# Patient Record
Sex: FEMALE | Race: WHITE | NOT HISPANIC OR LATINO | Employment: OTHER | URBAN - METROPOLITAN AREA
[De-identification: names, ages, dates, MRNs, and addresses within clinical notes are randomized per-mention and may not be internally consistent; named-entity substitution may affect disease eponyms.]

---

## 2018-06-11 ENCOUNTER — APPOINTMENT (OUTPATIENT)
Dept: PREADMISSION TESTING | Facility: HOSPITAL | Age: 38
End: 2018-06-11

## 2018-06-11 VITALS
OXYGEN SATURATION: 100 % | DIASTOLIC BLOOD PRESSURE: 84 MMHG | WEIGHT: 189 LBS | SYSTOLIC BLOOD PRESSURE: 137 MMHG | TEMPERATURE: 98.1 F | HEART RATE: 100 BPM | HEIGHT: 66 IN | RESPIRATION RATE: 16 BRPM | BODY MASS INDEX: 30.37 KG/M2

## 2018-06-11 LAB
ANION GAP SERPL CALCULATED.3IONS-SCNC: 12.8 MMOL/L
BUN BLD-MCNC: 15 MG/DL (ref 6–20)
BUN/CREAT SERPL: 18.8 (ref 7–25)
CALCIUM SPEC-SCNC: 9.2 MG/DL (ref 8.6–10.5)
CHLORIDE SERPL-SCNC: 105 MMOL/L (ref 98–107)
CO2 SERPL-SCNC: 22.2 MMOL/L (ref 22–29)
CREAT BLD-MCNC: 0.8 MG/DL (ref 0.57–1)
DEPRECATED RDW RBC AUTO: 40.5 FL (ref 37–54)
ERYTHROCYTE [DISTWIDTH] IN BLOOD BY AUTOMATED COUNT: 15.2 % (ref 11.7–13)
GFR SERPL CREATININE-BSD FRML MDRD: 80 ML/MIN/1.73
GLUCOSE BLD-MCNC: 99 MG/DL (ref 65–99)
HCG SERPL QL: NEGATIVE
HCT VFR BLD AUTO: 35.2 % (ref 35.6–45.5)
HGB BLD-MCNC: 10.6 G/DL (ref 11.9–15.5)
MCH RBC QN AUTO: 21.9 PG (ref 26.9–32)
MCHC RBC AUTO-ENTMCNC: 30.1 G/DL (ref 32.4–36.3)
MCV RBC AUTO: 72.7 FL (ref 80.5–98.2)
PLATELET # BLD AUTO: 206 10*3/MM3 (ref 140–500)
PMV BLD AUTO: 10.5 FL (ref 6–12)
POTASSIUM BLD-SCNC: 4.1 MMOL/L (ref 3.5–5.2)
RBC # BLD AUTO: 4.84 10*6/MM3 (ref 3.9–5.2)
SODIUM BLD-SCNC: 140 MMOL/L (ref 136–145)
WBC NRBC COR # BLD: 5.62 10*3/MM3 (ref 4.5–10.7)

## 2018-06-11 PROCEDURE — 36415 COLL VENOUS BLD VENIPUNCTURE: CPT

## 2018-06-11 PROCEDURE — 85027 COMPLETE CBC AUTOMATED: CPT | Performed by: OBSTETRICS & GYNECOLOGY

## 2018-06-11 PROCEDURE — 84703 CHORIONIC GONADOTROPIN ASSAY: CPT | Performed by: OBSTETRICS & GYNECOLOGY

## 2018-06-11 PROCEDURE — 80048 BASIC METABOLIC PNL TOTAL CA: CPT | Performed by: OBSTETRICS & GYNECOLOGY

## 2018-06-11 RX ORDER — MECLIZINE HCL 12.5 MG/1
12.5 TABLET ORAL 3 TIMES DAILY PRN
COMMUNITY
End: 2021-04-26 | Stop reason: SDUPTHER

## 2018-06-11 RX ORDER — TOPIRAMATE 50 MG/1
50 TABLET, FILM COATED ORAL EVERY MORNING
COMMUNITY
End: 2021-03-29

## 2018-06-11 RX ORDER — POLYETHYLENE GLYCOL 3350 17 G/17G
17 POWDER, FOR SOLUTION ORAL AS NEEDED
COMMUNITY
End: 2021-03-29

## 2018-06-11 RX ORDER — GUAIFENESIN, PSEUDOEPHEDRINE HYDROCHLORIDE 600; 60 MG/1; MG/1
1 TABLET, EXTENDED RELEASE ORAL EVERY 12 HOURS
COMMUNITY
End: 2021-03-29

## 2018-06-11 RX ORDER — CHOLECALCIFEROL (VITAMIN D3) 125 MCG
5 CAPSULE ORAL NIGHTLY PRN
COMMUNITY

## 2018-06-11 RX ORDER — FLUTICASONE PROPIONATE 50 MCG
1 SPRAY, SUSPENSION (ML) NASAL DAILY
COMMUNITY

## 2018-06-11 RX ORDER — BUPROPION HYDROCHLORIDE 150 MG/1
150 TABLET, EXTENDED RELEASE ORAL 2 TIMES DAILY
COMMUNITY
End: 2021-03-29

## 2018-06-11 NOTE — DISCHARGE INSTRUCTIONS
PLEASE ARRIVE AT 7:30 AM ON 6/13/2018      Take the following medications the morning of surgery with a small sip of water:  TOPAMAX      General Instructions:  • Do not eat solid food after midnight the night before surgery.  • You may drink clear liquids day of surgery but must stop at least one hour before your hospital arrival time. **STOP FLUIDS AT 6:30 AM ON DAY OF SURGERY**  • It is beneficial for you to have a clear drink that contains carbohydrates the day of surgery.  We suggest a 12 to 20 ounce bottle of Gatorade or Powerade for non-diabetic patients or a 12 to 20 ounce bottle of G2 or Powerade Zero for diabetic patients. (Pediatric patients, are not advised to drink a 12 to 20 ounce carbohydrate drink)    Clear liquids are liquids you can see through.  Nothing red in color.     Plain water                               Sports drinks  Sodas                                   Gelatin (Jell-O)  Fruit juices without pulp such as white grape juice and apple juice  Popsicles that contain no fruit or yogurt  Tea or coffee (no cream or milk added)  Gatorade / Powerade  G2 / Powerade Zero    • Infants may have breast milk up to four hours before surgery.  • Infants drinking formula may drink formula up to six hours before surgery.   • Patients who avoid smoking, chewing tobacco and alcohol for 4 weeks prior to surgery have a reduced risk of post-operative complications.  Quit smoking as many days before surgery as you can.  • Do not smoke, use chewing tobacco or drink alcohol the day of surgery.   • If applicable bring your C-PAP/ BI-PAP machine.  • Bring any papers given to you in the doctor’s office.  • Wear clean comfortable clothes and socks.  • Do not wear contact lenses or make-up.  Bring a case for your glasses.   • Bring crutches or walker if applicable.  • Remove all piercings.  Leave jewelry and any other valuables at home.  • Hair extensions with metal clips must be removed prior to surgery.  • The  Pre-Admission Testing nurse will instruct you to bring medications if unable to obtain an accurate list in Pre-Admission Testing.        Preventing a Surgical Site Infection:  • For 2 to 3 days before surgery, avoid shaving with a razor because the razor can irritate skin and make it easier to develop an infection.  • The night prior to surgery sleep in a clean bed with clean clothing.  Do not allow pets to sleep with you.  • Shower on the morning of surgery using a fresh bar of anti-bacterial soap (such as Dial) and clean washcloth.  Dry with a clean towel and dress in clean clothing.  • Ask your surgeon if you will be receiving antibiotics prior to surgery.  • Make sure you, your family, and all healthcare providers clean their hands with soap and water or an alcohol based hand  before caring for you or your wound.    Day of surgery:  Upon arrival, a Pre-op nurse and Anesthesiologist will review your health history, obtain vital signs, and answer questions you may have.  The only belongings needed at this time will be your home medications and if applicable your C-PAP/BI-PAP machine.  If you are staying overnight your family can leave the rest of your belongings in the car and bring them to your room later.  A Pre-op nurse will start an IV and you may receive medication in preparation for surgery, including something to help you relax.  Your family will be able to see you in the Pre-op area.  While you are in surgery your family should notify the waiting room  if they leave the waiting room area and provide a contact phone number.    Please be aware that surgery does come with discomfort.  We want to make every effort to control your discomfort so please discuss any uncontrolled symptoms with your nurse.   Your doctor will most likely have prescribed pain medications.      If you are going home after surgery you will receive individualized written care instructions before being discharged.  A  responsible adult must drive you to and from the hospital on the day of your surgery and stay with you for 24 hours.    If you are staying overnight following surgery, you will be transported to your hospital room following the recovery period.  [facility] has all private rooms.    If you have any questions please call Pre-Admission Testing at [phone].  Deductibles and co-payments are collected on the day of service. Please be prepared to pay the required co-pay, deductible or deposit on the day of service as defined by your plan.

## 2018-06-13 ENCOUNTER — HOSPITAL ENCOUNTER (OUTPATIENT)
Facility: HOSPITAL | Age: 38
Setting detail: HOSPITAL OUTPATIENT SURGERY
Discharge: HOME | End: 2018-06-13
Attending: OBSTETRICS & GYNECOLOGY | Admitting: OBSTETRICS & GYNECOLOGY

## 2018-06-13 ENCOUNTER — ANESTHESIA (OUTPATIENT)
Dept: PERIOP | Facility: HOSPITAL | Age: 38
End: 2018-06-13

## 2018-06-13 ENCOUNTER — ANESTHESIA EVENT (OUTPATIENT)
Dept: PERIOP | Facility: HOSPITAL | Age: 38
End: 2018-06-13

## 2018-06-13 VITALS
RESPIRATION RATE: 18 BRPM | HEART RATE: 88 BPM | TEMPERATURE: 97.9 F | DIASTOLIC BLOOD PRESSURE: 79 MMHG | SYSTOLIC BLOOD PRESSURE: 115 MMHG | OXYGEN SATURATION: 98 %

## 2018-06-13 DIAGNOSIS — N92.0 MENORRHAGIA: ICD-10-CM

## 2018-06-13 PROCEDURE — 25010000002 GENTAMICIN PER 80 MG: Performed by: OBSTETRICS & GYNECOLOGY

## 2018-06-13 PROCEDURE — 88305 TISSUE EXAM BY PATHOLOGIST: CPT | Performed by: OBSTETRICS & GYNECOLOGY

## 2018-06-13 PROCEDURE — 25010000002 MIDAZOLAM PER 1 MG: Performed by: ANESTHESIOLOGY

## 2018-06-13 PROCEDURE — C1782 MORCELLATOR: HCPCS | Performed by: OBSTETRICS & GYNECOLOGY

## 2018-06-13 PROCEDURE — 25010000002 ONDANSETRON PER 1 MG: Performed by: NURSE ANESTHETIST, CERTIFIED REGISTERED

## 2018-06-13 PROCEDURE — 25010000002 FENTANYL CITRATE (PF) 100 MCG/2ML SOLUTION

## 2018-06-13 PROCEDURE — 25010000002 FENTANYL CITRATE (PF) 100 MCG/2ML SOLUTION: Performed by: NURSE ANESTHETIST, CERTIFIED REGISTERED

## 2018-06-13 PROCEDURE — 25010000002 PROPOFOL 10 MG/ML EMULSION: Performed by: NURSE ANESTHETIST, CERTIFIED REGISTERED

## 2018-06-13 PROCEDURE — 25010000002 KETOROLAC TROMETHAMINE PER 15 MG: Performed by: NURSE ANESTHETIST, CERTIFIED REGISTERED

## 2018-06-13 PROCEDURE — 25010000002 FENTANYL CITRATE (PF) 100 MCG/2ML SOLUTION: Performed by: ANESTHESIOLOGY

## 2018-06-13 PROCEDURE — 25010000002 DEXAMETHASONE PER 1 MG: Performed by: NURSE ANESTHETIST, CERTIFIED REGISTERED

## 2018-06-13 RX ORDER — PROMETHAZINE HYDROCHLORIDE 25 MG/ML
6.25 INJECTION, SOLUTION INTRAMUSCULAR; INTRAVENOUS ONCE AS NEEDED
Status: DISCONTINUED | OUTPATIENT
Start: 2018-06-13 | End: 2018-06-13 | Stop reason: HOSPADM

## 2018-06-13 RX ORDER — ONDANSETRON 2 MG/ML
INJECTION INTRAMUSCULAR; INTRAVENOUS AS NEEDED
Status: DISCONTINUED | OUTPATIENT
Start: 2018-06-13 | End: 2018-06-13 | Stop reason: SURG

## 2018-06-13 RX ORDER — SODIUM CHLORIDE 9 MG/ML
INJECTION, SOLUTION INTRAVENOUS AS NEEDED
Status: DISCONTINUED | OUTPATIENT
Start: 2018-06-13 | End: 2018-06-13 | Stop reason: HOSPADM

## 2018-06-13 RX ORDER — FENTANYL CITRATE 50 UG/ML
INJECTION, SOLUTION INTRAMUSCULAR; INTRAVENOUS AS NEEDED
Status: DISCONTINUED | OUTPATIENT
Start: 2018-06-13 | End: 2018-06-13 | Stop reason: SURG

## 2018-06-13 RX ORDER — HYDROMORPHONE HYDROCHLORIDE 1 MG/ML
0.5 INJECTION, SOLUTION INTRAMUSCULAR; INTRAVENOUS; SUBCUTANEOUS
Status: DISCONTINUED | OUTPATIENT
Start: 2018-06-13 | End: 2018-06-13 | Stop reason: HOSPADM

## 2018-06-13 RX ORDER — FAMOTIDINE 10 MG/ML
20 INJECTION, SOLUTION INTRAVENOUS ONCE
Status: COMPLETED | OUTPATIENT
Start: 2018-06-13 | End: 2018-06-13

## 2018-06-13 RX ORDER — MIDAZOLAM HYDROCHLORIDE 1 MG/ML
2 INJECTION INTRAMUSCULAR; INTRAVENOUS
Status: DISCONTINUED | OUTPATIENT
Start: 2018-06-13 | End: 2018-06-13 | Stop reason: HOSPADM

## 2018-06-13 RX ORDER — LIDOCAINE HYDROCHLORIDE 20 MG/ML
INJECTION, SOLUTION INFILTRATION; PERINEURAL AS NEEDED
Status: DISCONTINUED | OUTPATIENT
Start: 2018-06-13 | End: 2018-06-13 | Stop reason: SURG

## 2018-06-13 RX ORDER — OXYCODONE HYDROCHLORIDE AND ACETAMINOPHEN 5; 325 MG/1; MG/1
1 TABLET ORAL EVERY 4 HOURS PRN
Qty: 30 TABLET | Refills: 0 | Status: SHIPPED | OUTPATIENT
Start: 2018-06-13 | End: 2021-03-29

## 2018-06-13 RX ORDER — LABETALOL HYDROCHLORIDE 5 MG/ML
5 INJECTION, SOLUTION INTRAVENOUS
Status: DISCONTINUED | OUTPATIENT
Start: 2018-06-13 | End: 2018-06-13 | Stop reason: HOSPADM

## 2018-06-13 RX ORDER — FLUMAZENIL 0.1 MG/ML
0.2 INJECTION INTRAVENOUS AS NEEDED
Status: DISCONTINUED | OUTPATIENT
Start: 2018-06-13 | End: 2018-06-13 | Stop reason: HOSPADM

## 2018-06-13 RX ORDER — CLINDAMYCIN PHOSPHATE 900 MG/50ML
900 INJECTION INTRAVENOUS ONCE
Status: COMPLETED | OUTPATIENT
Start: 2018-06-13 | End: 2018-06-13

## 2018-06-13 RX ORDER — OXYCODONE HYDROCHLORIDE AND ACETAMINOPHEN 5; 325 MG/1; MG/1
2 TABLET ORAL ONCE AS NEEDED
Status: DISCONTINUED | OUTPATIENT
Start: 2018-06-13 | End: 2018-06-13 | Stop reason: HOSPADM

## 2018-06-13 RX ORDER — MAGNESIUM HYDROXIDE 1200 MG/15ML
LIQUID ORAL AS NEEDED
Status: DISCONTINUED | OUTPATIENT
Start: 2018-06-13 | End: 2018-06-13 | Stop reason: HOSPADM

## 2018-06-13 RX ORDER — BUPIVACAINE HYDROCHLORIDE AND EPINEPHRINE 5; 5 MG/ML; UG/ML
INJECTION, SOLUTION EPIDURAL; INTRACAUDAL; PERINEURAL AS NEEDED
Status: DISCONTINUED | OUTPATIENT
Start: 2018-06-13 | End: 2018-06-13 | Stop reason: HOSPADM

## 2018-06-13 RX ORDER — FENTANYL CITRATE 50 UG/ML
INJECTION, SOLUTION INTRAMUSCULAR; INTRAVENOUS
Status: COMPLETED
Start: 2018-06-13 | End: 2018-06-13

## 2018-06-13 RX ORDER — SCOLOPAMINE TRANSDERMAL SYSTEM 1 MG/1
1 PATCH, EXTENDED RELEASE TRANSDERMAL ONCE
Status: DISCONTINUED | OUTPATIENT
Start: 2018-06-13 | End: 2018-06-13 | Stop reason: HOSPADM

## 2018-06-13 RX ORDER — DIPHENHYDRAMINE HYDROCHLORIDE 50 MG/ML
6.25 INJECTION INTRAMUSCULAR; INTRAVENOUS
Status: DISCONTINUED | OUTPATIENT
Start: 2018-06-13 | End: 2018-06-13 | Stop reason: HOSPADM

## 2018-06-13 RX ORDER — EPHEDRINE SULFATE 50 MG/ML
5 INJECTION, SOLUTION INTRAVENOUS ONCE AS NEEDED
Status: DISCONTINUED | OUTPATIENT
Start: 2018-06-13 | End: 2018-06-13 | Stop reason: HOSPADM

## 2018-06-13 RX ORDER — ONDANSETRON 2 MG/ML
4 INJECTION INTRAMUSCULAR; INTRAVENOUS ONCE AS NEEDED
Status: DISCONTINUED | OUTPATIENT
Start: 2018-06-13 | End: 2018-06-13 | Stop reason: HOSPADM

## 2018-06-13 RX ORDER — PROMETHAZINE HYDROCHLORIDE 25 MG/1
25 SUPPOSITORY RECTAL ONCE AS NEEDED
Status: DISCONTINUED | OUTPATIENT
Start: 2018-06-13 | End: 2018-06-13 | Stop reason: HOSPADM

## 2018-06-13 RX ORDER — PROPOFOL 10 MG/ML
VIAL (ML) INTRAVENOUS AS NEEDED
Status: DISCONTINUED | OUTPATIENT
Start: 2018-06-13 | End: 2018-06-13 | Stop reason: SURG

## 2018-06-13 RX ORDER — SODIUM CHLORIDE, SODIUM LACTATE, POTASSIUM CHLORIDE, CALCIUM CHLORIDE 600; 310; 30; 20 MG/100ML; MG/100ML; MG/100ML; MG/100ML
9 INJECTION, SOLUTION INTRAVENOUS CONTINUOUS
Status: DISCONTINUED | OUTPATIENT
Start: 2018-06-13 | End: 2018-06-13 | Stop reason: HOSPADM

## 2018-06-13 RX ORDER — FENTANYL CITRATE 50 UG/ML
50 INJECTION, SOLUTION INTRAMUSCULAR; INTRAVENOUS
Status: DISCONTINUED | OUTPATIENT
Start: 2018-06-13 | End: 2018-06-13 | Stop reason: HOSPADM

## 2018-06-13 RX ORDER — DEXAMETHASONE SODIUM PHOSPHATE 4 MG/ML
INJECTION, SOLUTION INTRA-ARTICULAR; INTRALESIONAL; INTRAMUSCULAR; INTRAVENOUS; SOFT TISSUE AS NEEDED
Status: DISCONTINUED | OUTPATIENT
Start: 2018-06-13 | End: 2018-06-13 | Stop reason: SURG

## 2018-06-13 RX ORDER — IBUPROFEN 800 MG/1
800 TABLET ORAL EVERY 6 HOURS PRN
Qty: 50 TABLET | Refills: 3 | Status: SHIPPED | OUTPATIENT
Start: 2018-06-13 | End: 2021-03-29

## 2018-06-13 RX ORDER — HYDROCODONE BITARTRATE AND ACETAMINOPHEN 7.5; 325 MG/1; MG/1
1 TABLET ORAL ONCE AS NEEDED
Status: COMPLETED | OUTPATIENT
Start: 2018-06-13 | End: 2018-06-13

## 2018-06-13 RX ORDER — PROMETHAZINE HYDROCHLORIDE 25 MG/1
25 TABLET ORAL ONCE AS NEEDED
Status: DISCONTINUED | OUTPATIENT
Start: 2018-06-13 | End: 2018-06-13 | Stop reason: HOSPADM

## 2018-06-13 RX ORDER — FENTANYL CITRATE 50 UG/ML
100 INJECTION, SOLUTION INTRAMUSCULAR; INTRAVENOUS
Status: DISCONTINUED | OUTPATIENT
Start: 2018-06-13 | End: 2018-06-13 | Stop reason: HOSPADM

## 2018-06-13 RX ORDER — HYDRALAZINE HYDROCHLORIDE 20 MG/ML
5 INJECTION INTRAMUSCULAR; INTRAVENOUS
Status: DISCONTINUED | OUTPATIENT
Start: 2018-06-13 | End: 2018-06-13 | Stop reason: HOSPADM

## 2018-06-13 RX ORDER — MIDAZOLAM HYDROCHLORIDE 1 MG/ML
1 INJECTION INTRAMUSCULAR; INTRAVENOUS
Status: DISCONTINUED | OUTPATIENT
Start: 2018-06-13 | End: 2018-06-13 | Stop reason: HOSPADM

## 2018-06-13 RX ORDER — KETOROLAC TROMETHAMINE 30 MG/ML
INJECTION, SOLUTION INTRAMUSCULAR; INTRAVENOUS AS NEEDED
Status: DISCONTINUED | OUTPATIENT
Start: 2018-06-13 | End: 2018-06-13 | Stop reason: SURG

## 2018-06-13 RX ORDER — LIDOCAINE HYDROCHLORIDE 10 MG/ML
0.5 INJECTION, SOLUTION EPIDURAL; INFILTRATION; INTRACAUDAL; PERINEURAL ONCE AS NEEDED
Status: DISCONTINUED | OUTPATIENT
Start: 2018-06-13 | End: 2018-06-13 | Stop reason: HOSPADM

## 2018-06-13 RX ORDER — SODIUM CHLORIDE 0.9 % (FLUSH) 0.9 %
1-10 SYRINGE (ML) INJECTION AS NEEDED
Status: DISCONTINUED | OUTPATIENT
Start: 2018-06-13 | End: 2018-06-13 | Stop reason: HOSPADM

## 2018-06-13 RX ADMIN — ONDANSETRON 4 MG: 2 INJECTION INTRAMUSCULAR; INTRAVENOUS at 09:32

## 2018-06-13 RX ADMIN — LIDOCAINE HYDROCHLORIDE 50 MG: 20 INJECTION, SOLUTION INFILTRATION; PERINEURAL at 09:25

## 2018-06-13 RX ADMIN — MIDAZOLAM 2 MG: 1 INJECTION INTRAMUSCULAR; INTRAVENOUS at 08:41

## 2018-06-13 RX ADMIN — FENTANYL CITRATE 50 MCG: 50 INJECTION INTRAMUSCULAR; INTRAVENOUS at 11:36

## 2018-06-13 RX ADMIN — CLINDAMYCIN PHOSPHATE 900 MG: 900 INJECTION INTRAVENOUS at 09:37

## 2018-06-13 RX ADMIN — KETOROLAC TROMETHAMINE 30 MG: 30 INJECTION, SOLUTION INTRAMUSCULAR; INTRAVENOUS at 09:58

## 2018-06-13 RX ADMIN — FENTANYL CITRATE 50 MCG: 50 INJECTION INTRAMUSCULAR; INTRAVENOUS at 09:30

## 2018-06-13 RX ADMIN — SCOPOLAMINE 1 PATCH: 1 PATCH, EXTENDED RELEASE TRANSDERMAL at 08:41

## 2018-06-13 RX ADMIN — HYDROCODONE BITARTRATE AND ACETAMINOPHEN 1 TABLET: 7.5; 325 TABLET ORAL at 11:00

## 2018-06-13 RX ADMIN — FENTANYL CITRATE 50 MCG: 50 INJECTION INTRAMUSCULAR; INTRAVENOUS at 09:25

## 2018-06-13 RX ADMIN — GENTAMICIN SULFATE 500 MG: 40 INJECTION, SOLUTION INTRAMUSCULAR; INTRAVENOUS at 10:30

## 2018-06-13 RX ADMIN — FENTANYL CITRATE 50 MCG: 50 INJECTION INTRAMUSCULAR; INTRAVENOUS at 10:48

## 2018-06-13 RX ADMIN — PROPOFOL 200 MG: 10 INJECTION, EMULSION INTRAVENOUS at 09:25

## 2018-06-13 RX ADMIN — DEXAMETHASONE SODIUM PHOSPHATE 4 MG: 4 INJECTION, SOLUTION INTRAMUSCULAR; INTRAVENOUS at 09:30

## 2018-06-13 RX ADMIN — SODIUM CHLORIDE, POTASSIUM CHLORIDE, SODIUM LACTATE AND CALCIUM CHLORIDE 9 ML/HR: 600; 310; 30; 20 INJECTION, SOLUTION INTRAVENOUS at 08:29

## 2018-06-13 RX ADMIN — FENTANYL CITRATE 50 MCG: 50 INJECTION INTRAMUSCULAR; INTRAVENOUS at 11:03

## 2018-06-13 RX ADMIN — FAMOTIDINE 20 MG: 10 INJECTION, SOLUTION INTRAVENOUS at 08:42

## 2018-06-13 NOTE — INTERVAL H&P NOTE
H&P reviewed. The patient was examined and there are no changes to the H&P.     Zayda Walters MD  06/13/18  8:54 AM    
Waldemar Wang)

## 2018-06-13 NOTE — ANESTHESIA PREPROCEDURE EVALUATION
Anesthesia Evaluation     Patient summary reviewed and Nursing notes reviewed   history of anesthetic complications: PONV  NPO Solid Status: > 8 hours             Airway   Mallampati: II  TM distance: >3 FB  Neck ROM: full  no difficulty expected  Dental - normal exam     Pulmonary - negative pulmonary ROS and normal exam   Cardiovascular - negative cardio ROS and normal exam        Neuro/Psych- negative ROS  GI/Hepatic/Renal/Endo - negative ROS     Musculoskeletal (-) negative ROS    Abdominal  - normal exam   Substance History - negative use     OB/GYN negative ob/gyn ROS         Other                        Anesthesia Plan    ASA 2     general     intravenous induction   Anesthetic plan and risks discussed with patient.    Plan discussed with CRNA.

## 2018-06-13 NOTE — OP NOTE
Subjective     Date of Service:  06/13/18    Surgical Staff: Surgeon(s) and Role:     * Zayda Walters MD - Primary   Assistant:  none     Pre-operative diagnosis(es): menorrhagia     Post-operative diagnosis(es): same       Procedure(s): Procedure(s):  HYSTEROSCOPY DILATATION/CURETTAGE/NOVASURE ablation/Myosure polyp removal           Anesthesia: Type: General  ASA:  II     Objective      Operative findings: Normal cavity   Specimens removed: Endometrial polyp       EBL:    Specimens:   minimal    Endometrial polyp   Antibiotics:                Clindamycin, gentamycin ordered on call to OR   Drains:  none       Complications: none       Indication for surgery: menorrhagia       Description of Procedure: Patient was identified in her room and taken to the operating room.  She is placed in the supine position and sedated.  She was then given general laryngeal mask anesthesia.  She was prepped and draped and surgical timeout was performed to assess correct patient and procedure.  Weighted speculum was placed in the vagina and anterior lip of the cervix was grasped with an Allis clamp.  Uterine cavity was sounded to a depth of 8 cm.  Dilatation of cervix was easily accomplished.  Hysteroscope was inserted normal cavity was noted.  Sharp curettage was done to obtain some endometrial curettings.  Polyp was visible at left side of uterus, and Myosure was used to remove it completely.  NovaSure endometrial ablation device was placed in the cavity to a depth of 4 cm and a width of 3.6 cm.  Treatment cycle was performed and when completed, the device was removed from the cavity.  Hysteroscope was reinserted and good treatment was noted.  Instruments were removed from the vagina and patient was taken out lithotomy position.  Sponge and instrument counts are correct and patient was taken to recovery.           Assessment/Plan               Zayda Watlers MD  06/13/18  10:03 AM

## 2018-06-13 NOTE — ANESTHESIA PROCEDURE NOTES
Airway  Urgency: elective    Date/Time: 6/13/2018 9:26 AM  Airway not difficult    General Information and Staff    Patient location during procedure: OR  Anesthesiologist: BIN BAL  CRNA: KARRIE BAER    Indications and Patient Condition  Indications for airway management: airway protection    Preoxygenated: yes  MILS maintained throughout  Mask difficulty assessment: 1 - vent by mask    Final Airway Details  Final airway type: supraglottic airway      Successful airway: classic  Size 4    Number of attempts at approach: 1    Additional Comments  Patient in OR. Monitors on. BLVS. Pre 02 100%. SIVI. LMA placed with ease. No leak present. BBS and ETCO2 present. Secured. Teeth/lips as preop.

## 2018-06-13 NOTE — ANESTHESIA POSTPROCEDURE EVALUATION
Patient: Ghazala Early    Procedure Summary     Date:  06/13/18 Room / Location:   [facility]    Anesthesia Start:  0921 Anesthesia Stop:  1016    Procedure:  HYSTEROSCOPY DILATATION/CURETTAGE/NOVASURE, MYOSURE (N/A Vagina) Diagnosis:      Surgeon:  Zayda Walters MD Provider:  Kris Norman MD    Anesthesia Type:  general ASA Status:  2          Anesthesia Type: general  Last vitals  BP   115/79 (06/13/18 1115)   Temp   36.6 °C (97.9 °F) (06/13/18 1115)   Pulse   88 (06/13/18 1153)   Resp   18 (06/13/18 1153)     SpO2   98 % (06/13/18 1153)     Post Anesthesia Care and Evaluation    Patient location during evaluation: bedside  Patient participation: complete - patient participated  Level of consciousness: awake and alert  Pain management: adequate  Airway patency: patent  Anesthetic complications: No anesthetic complications    Cardiovascular status: acceptable  Respiratory status: acceptable  Hydration status: acceptable    Comments: /79   Pulse 88   Temp 36.6 °C (97.9 °F) (Temporal Artery )   Resp 18   LMP 05/21/2018   SpO2 98%

## 2018-06-14 LAB
CYTO UR: NORMAL
LAB AP CASE REPORT: NORMAL
Lab: NORMAL
PATH REPORT.FINAL DX SPEC: NORMAL
PATH REPORT.GROSS SPEC: NORMAL

## 2020-05-21 ENCOUNTER — APPOINTMENT (OUTPATIENT)
Dept: WOMENS IMAGING | Facility: HOSPITAL | Age: 40
End: 2020-05-21

## 2020-05-21 PROCEDURE — 77067 SCR MAMMO BI INCL CAD: CPT | Performed by: RADIOLOGY

## 2020-05-21 PROCEDURE — 77063 BREAST TOMOSYNTHESIS BI: CPT | Performed by: RADIOLOGY

## 2020-06-11 ENCOUNTER — APPOINTMENT (OUTPATIENT)
Dept: WOMENS IMAGING | Facility: HOSPITAL | Age: 40
End: 2020-06-11

## 2020-06-11 PROCEDURE — G0279 TOMOSYNTHESIS, MAMMO: HCPCS | Performed by: RADIOLOGY

## 2020-06-11 PROCEDURE — 77065 DX MAMMO INCL CAD UNI: CPT | Performed by: RADIOLOGY

## 2020-06-11 PROCEDURE — 76641 ULTRASOUND BREAST COMPLETE: CPT | Performed by: RADIOLOGY

## 2020-06-11 PROCEDURE — 77061 BREAST TOMOSYNTHESIS UNI: CPT | Performed by: RADIOLOGY

## 2020-11-16 ENCOUNTER — TELEMEDICINE (OUTPATIENT)
Dept: FAMILY MEDICINE CLINIC | Facility: TELEHEALTH | Age: 40
End: 2020-11-16

## 2020-11-16 VITALS — TEMPERATURE: 97.7 F | BODY MASS INDEX: 38.57 KG/M2 | HEIGHT: 66 IN | WEIGHT: 240 LBS

## 2020-11-16 DIAGNOSIS — L23.7 POISON IVY: Primary | ICD-10-CM

## 2020-11-16 PROCEDURE — 99213 OFFICE O/P EST LOW 20 MIN: CPT | Performed by: NURSE PRACTITIONER

## 2020-11-16 RX ORDER — PREDNISONE 20 MG/1
TABLET ORAL
Qty: 13 TABLET | Refills: 0 | Status: SHIPPED | OUTPATIENT
Start: 2020-11-16 | End: 2021-03-29

## 2020-11-16 NOTE — PROGRESS NOTES
"CHIEF COMPLAINT  Cc: rash      HPI  Ghazala Early is a 40 y.o. female  presents with complaint of an itchy red rah. She was at [facility] a week ago and thinks she picked up poison ivy there. They were clearing a road. A relative has it as well. It is on her left arm and is red, itchy with some little blisters. She has history of having had  poison ivy. She has tried benadryl cream and an anti itch cream without much relief from her symtpoms.     Review of Systems   Constitutional: Negative for fever.   HENT: Negative for congestion and sore throat.         No loss of taste of taste and smell   Respiratory: Negative for cough, shortness of breath and wheezing.    Cardiovascular: Negative for chest pain.   Gastrointestinal: Negative for diarrhea, nausea and vomiting.   Musculoskeletal: Negative for myalgias.   Skin: Rash: red itchy rash on left arm with some blisters.   Neurological: Negative for headaches.       Past Medical History:   Diagnosis Date   • ADD (attention deficit disorder)    • Anemia    • Anxiety    • Eczema    • Endometrial mass    • Heavy menstrual bleeding    • History of cellulitis      INCISION   • Migraines    • PONV (postoperative nausea and vomiting)    • Seasonal allergies    • Sinus drainage    • Vertigo        Family History   Problem Relation Age of Onset   • Malig Hyperthermia Neg Hx        Social History     Socioeconomic History   • Marital status:      Spouse name: Not on file   • Number of children: Not on file   • Years of education: Not on file   • Highest education level: Not on file   Tobacco Use   • Smoking status: Never Smoker   • Smokeless tobacco: Never Used   Substance and Sexual Activity   • Alcohol use: No   • Drug use: No   • Sexual activity: Yes     Partners: Male         Temp 97.7 °F (36.5 °C)   Ht 166.4 cm (65.5\")   Wt 109 kg (240 lb)   Breastfeeding No   BMI 39.33 kg/m²     PHYSICAL EXAM  Physical Exam   Constitutional: She appears " well-developed and well-nourished.   HENT:   Head: Normocephalic and atraumatic.   Right Ear: Hearing and external ear normal.   Left Ear: Hearing and external ear normal.   Nose: Nose normal.   Mouth/Throat: Mouth/Lips are normal.  Eyes: Lids are normal. Right eye exhibits no discharge and no exudate. Left eye exhibits no discharge and no exudate. Right conjunctiva is not injected. Left conjunctiva is not injected.   Pulmonary/Chest: No accessory muscle usage. No tachypnea and no bradypnea.  No respiratory distress.No use of oxygen by nasal cannulaNo use of oxygen by mask noted.  Abdominal: Abdomen appears normal.   Neurological: She is alert. No cranial nerve deficit.   Skin: Rash noted. Rash is vesicular (left arm, erythematous linear rash with some vesicles).   Psychiatric: She has a normal mood and affect. Her speech is normal and behavior is normal. Judgment and thought content normal.       Results for orders placed or performed during the hospital encounter of 06/13/18   Tissue Pathology Exam    Specimen: Endometrial Curettings; Tissue   Result Value Ref Range    Case Report       Surgical Pathology Report                         Case: [id number]                                  Authorizing Provider:  Zayda Walters MD         Collected:           06/13/2018 09:57 AM          Ordering Location:     [facility]  Received:            06/13/2018 11:46 AM                                 [facility] OR                                                                       Pathologist:           Johny Desouza MD                                                        Specimen:    Endometrial Curettings, endometrial curettings and polyp                                   Final Diagnosis       1: ENDOMETRIUM, CURETTINGS:   FRAGMENTS OF MID TO LATE SECRETORY ENDOMETRIUM.   FRAGMENTS OF BENIGN SMOOTH MUSCLE.   SCANTY FRAGMENTS OF BENIGN ECTOCERVIX.      IHC/a/CMK   DEMI/brittany    CPT CODES:  1: 95309      Gross  "Description       1. Received in formalin labeled \"endometrial curettings and polyp\" is a 2.5 x 2.0 x 0.5 cm aggregate of pink tan to hemorrhagic tissue fragments received on telfa in a cloth mesh bag.  The tissue is entirely submitted in a single block labeled 1A.  CC/USO/CMK/pkm       Microscopic Description       Performed, incorporated in diagnosis.       Embedded Images         Diagnoses and all orders for this visit:    1. Poison ivy (Primary)    Other orders  -     predniSONE (DELTASONE) 20 MG tablet; Prednisone 20mg tabs, 3 for 2 days, 2 for 2 days, 1 for 2 days, 1/2 for 1 days take with food or milk  Dispense: 13 tablet; Refill: 0      Take prednisone as early in the day as possible with food  Cetrizine in the daytime until rash is gone  Benadryl 25mg at bedtime until rash is gone    FOLLOW-UP    COVID-19  if at any time you experience fever AND/OR cough AND/OR shortness of breath AND/OR loss of sense of taste or smell you are being advised to go to the nearest urgent care or emergency department for evaluation AND/OR testing    if no improvement you may schedule a f/u PCP/virtual visit or E-visit    Patient verbalizes understanding of medication dosage, comfort measures, instructions for treatment and follow-up.    Tiffanie Kapadia, APRN  11/16/2020  13:10 EST    This visit was performed via Telehealth.  This patient has been instructed to follow-up with their primary care provider if their symptoms worsen or the treatment provided does not resolve their illness.        "

## 2021-03-15 ENCOUNTER — IMMUNIZATION (OUTPATIENT)
Dept: VACCINE CLINIC | Facility: HOSPITAL | Age: 41
End: 2021-03-15

## 2021-03-15 PROCEDURE — 91300 HC SARSCOV02 VAC 30MCG/0.3ML IM: CPT | Performed by: INTERNAL MEDICINE

## 2021-03-15 PROCEDURE — 0001A: CPT | Performed by: INTERNAL MEDICINE

## 2021-03-29 ENCOUNTER — OFFICE VISIT (OUTPATIENT)
Dept: FAMILY MEDICINE CLINIC | Facility: CLINIC | Age: 41
End: 2021-03-29

## 2021-03-29 ENCOUNTER — OUTSIDE FACILITY SERVICE (OUTPATIENT)
Dept: FAMILY MEDICINE CLINIC | Facility: CLINIC | Age: 41
End: 2021-03-29

## 2021-03-29 VITALS
DIASTOLIC BLOOD PRESSURE: 103 MMHG | BODY MASS INDEX: 38.73 KG/M2 | HEART RATE: 94 BPM | WEIGHT: 241 LBS | HEIGHT: 66 IN | SYSTOLIC BLOOD PRESSURE: 143 MMHG | OXYGEN SATURATION: 97 % | RESPIRATION RATE: 16 BRPM | TEMPERATURE: 97.6 F

## 2021-03-29 DIAGNOSIS — G47.8 NON-RESTORATIVE SLEEP: ICD-10-CM

## 2021-03-29 DIAGNOSIS — G89.29 CHRONIC LEFT HIP PAIN: ICD-10-CM

## 2021-03-29 DIAGNOSIS — G25.81 RESTLESS LEGS: ICD-10-CM

## 2021-03-29 DIAGNOSIS — Z00.00 ANNUAL PHYSICAL EXAM: Primary | ICD-10-CM

## 2021-03-29 DIAGNOSIS — M25.552 CHRONIC LEFT HIP PAIN: ICD-10-CM

## 2021-03-29 DIAGNOSIS — G47.10 HYPERSOMNIA: ICD-10-CM

## 2021-03-29 DIAGNOSIS — E66.9 OBESITY (BMI 30-39.9): ICD-10-CM

## 2021-03-29 PROCEDURE — 99396 PREV VISIT EST AGE 40-64: CPT | Performed by: FAMILY MEDICINE

## 2021-03-29 PROCEDURE — 73502 X-RAY EXAM HIP UNI 2-3 VIEWS: CPT | Performed by: FAMILY MEDICINE

## 2021-03-29 PROCEDURE — OUTSIDEPOS PR OUTSIDE POS PLACEHOLDER: Performed by: FAMILY MEDICINE

## 2021-03-29 NOTE — PATIENT INSTRUCTIONS
Health Maintenance, Female  Adopting a healthy lifestyle and getting preventive care are important in promoting health and wellness. Ask your health care provider about:  · The right schedule for you to have regular tests and exams.  · Things you can do on your own to prevent diseases and keep yourself healthy.  What should I know about diet, weight, and exercise?  Eat a healthy diet    · Eat a diet that includes plenty of vegetables, fruits, low-fat dairy products, and lean protein.  · Do not eat a lot of foods that are high in solid fats, added sugars, or sodium.  Maintain a healthy weight  Body mass index (BMI) is used to identify weight problems. It estimates body fat based on height and weight. Your health care provider can help determine your BMI and help you achieve or maintain a healthy weight.  Get regular exercise  Get regular exercise. This is one of the most important things you can do for your health. Most adults should:  · Exercise for at least 150 minutes each week. The exercise should increase your heart rate and make you sweat (moderate-intensity exercise).  · Do strengthening exercises at least twice a week. This is in addition to the moderate-intensity exercise.  · Spend less time sitting. Even light physical activity can be beneficial.  Watch cholesterol and blood lipids  Have your blood tested for lipids and cholesterol at 20 years of age, then have this test every 5 years.  Have your cholesterol levels checked more often if:  · Your lipid or cholesterol levels are high.  · You are older than 40 years of age.  · You are at high risk for heart disease.  What should I know about cancer screening?  Depending on your health history and family history, you may need to have cancer screening at various ages. This may include screening for:  · Breast cancer.  · Cervical cancer.  · Colorectal cancer.  · Skin cancer.  · Lung cancer.  What should I know about heart disease, diabetes, and high blood  pressure?  Blood pressure and heart disease  · High blood pressure causes heart disease and increases the risk of stroke. This is more likely to develop in people who have high blood pressure readings, are of  descent, or are overweight.  · Have your blood pressure checked:  ? Every 3-5 years if you are 18-39 years of age.  ? Every year if you are 40 years old or older.  Diabetes  Have regular diabetes screenings. This checks your fasting blood sugar level. Have the screening done:  · Once every three years after age 40 if you are at a normal weight and have a low risk for diabetes.  · More often and at a younger age if you are overweight or have a high risk for diabetes.  What should I know about preventing infection?  Hepatitis B  If you have a higher risk for hepatitis B, you should be screened for this virus. Talk with your health care provider to find out if you are at risk for hepatitis B infection.  Hepatitis C  Testing is recommended for:  · Everyone born from 1945 through 1965.  · Anyone with known risk factors for hepatitis C.  Sexually transmitted infections (STIs)  · Get screened for STIs, including gonorrhea and chlamydia, if:  ? You are sexually active and are younger than 24 years of age.  ? You are older than 24 years of age and your health care provider tells you that you are at risk for this type of infection.  ? Your sexual activity has changed since you were last screened, and you are at increased risk for chlamydia or gonorrhea. Ask your health care provider if you are at risk.  · Ask your health care provider about whether you are at high risk for HIV. Your health care provider may recommend a prescription medicine to help prevent HIV infection. If you choose to take medicine to prevent HIV, you should first get tested for HIV. You should then be tested every 3 months for as long as you are taking the medicine.  Pregnancy  · If you are about to stop having your period (premenopausal) and  you may become pregnant, seek counseling before you get pregnant.  · Take 400 to 800 micrograms (mcg) of folic acid every day if you become pregnant.  · Ask for birth control (contraception) if you want to prevent pregnancy.  Osteoporosis and menopause  Osteoporosis is a disease in which the bones lose minerals and strength with aging. This can result in bone fractures. If you are 65 years old or older, or if you are at risk for osteoporosis and fractures, ask your health care provider if you should:  · Be screened for bone loss.  · Take a calcium or vitamin D supplement to lower your risk of fractures.  · Be given hormone replacement therapy (HRT) to treat symptoms of menopause.  Follow these instructions at home:  Lifestyle  · Do not use any products that contain nicotine or tobacco, such as cigarettes, e-cigarettes, and chewing tobacco. If you need help quitting, ask your health care provider.  · Do not use street drugs.  · Do not share needles.  · Ask your health care provider for help if you need support or information about quitting drugs.  Alcohol use  · Do not drink alcohol if:  ? Your health care provider tells you not to drink.  ? You are pregnant, may be pregnant, or are planning to become pregnant.  · If you drink alcohol:  ? Limit how much you use to 0-1 drink a day.  ? Limit intake if you are breastfeeding.  · Be aware of how much alcohol is in your drink. In the [place] one drink equals one 12 oz bottle of beer (355 mL), one 5 oz glass of wine (148 mL), or one 1½ oz glass of hard liquor (44 mL).  General instructions  · Schedule regular health, dental, and eye exams.  · Stay current with your vaccines.  · Tell your health care provider if:  ? You often feel depressed.  ? You have ever been abused or do not feel safe at home.  Summary  · Adopting a healthy lifestyle and getting preventive care are important in promoting health and wellness.  · Follow your health care provider's instructions about healthy  diet, exercising, and getting tested or screened for diseases.  · Follow your health care provider's instructions on monitoring your cholesterol and blood pressure.  This information is not intended to replace advice given to you by your health care provider. Make sure you discuss any questions you have with your health care provider.  Document Revised: 12/11/2019 Document Reviewed: 12/11/2019  "[employer] Patient Education © 2021 "[employer] Inc.    How to Take Your Blood Pressure  Blood pressure measures how strongly your blood is pressing against the walls of your arteries. Arteries are blood vessels that carry blood from your heart throughout your body. You can take your blood pressure at home with a machine.  You may need to check your blood pressure at home:  · To check if you have high blood pressure (hypertension).  · To check your blood pressure over time.  · To make sure your blood pressure medicine is working.  Supplies needed:  · Blood pressure machine, or monitor.  · Dining room chair to sit in.  · Table or desk.  · Small notebook.  · Pencil or pen.  How to prepare  Avoid these things for 30 minutes before checking your blood pressure:  · Having drinks with caffeine in them, such as coffee or tea.  · Drinking alcohol.  · Eating.  · Smoking.  · Exercising.  Do these things five minutes before checking your blood pressure:  · Go to the bathroom and pee (urinate).  · Sit in a dining chair. Do not sit in a soft couch or an armchair.  · Be quiet. Do not talk.  How to take your blood pressure  Follow the instructions that came with your machine. If you have a digital blood pressure monitor, these may be the instructions:  1. Sit up straight.  2. Place your feet on the floor. Do not cross your ankles or legs.  3. Rest your left arm at the level of your heart. You may rest it on a table, desk, or chair.  4. Pull up your shirt sleeve.  5. Wrap the blood pressure cuff around the upper part of your left arm. The cuff  "should be 1 inch (2.5 cm) above your elbow. It is best to wrap the cuff around bare skin.  6. Fit the cuff snugly around your arm. You should be able to place only one finger between the cuff and your arm.  7. Place the cord so that it rests in the bend of your elbow.  8. Press the power button.  9. Sit quietly while the cuff fills with air and loses air.  10. Write down the numbers on the screen.  11. Wait 2-3 minutes and then repeat steps 1-10.  What do the numbers mean?  Two numbers make up your blood pressure. The first number is called systolic pressure. The second is called diastolic pressure. An example of a blood pressure reading is \"120 over 80\" (or 120/80).  If you are an adult and do not have a medical condition, use this guide to find out if your blood pressure is normal:  Normal  · First number: below 120.  · Second number: below 80.  Elevated  · First number: 120-129.  · Second number: below 80.  Hypertension stage 1  · First number: 130-139.  · Second number: 80-89.  Hypertension stage 2  · First number: 140 or above.  · Second number: 90 or above.  Your blood pressure is above normal even if only the top or bottom number is above normal.  Follow these instructions at home:  · Check your blood pressure as often as your doctor tells you to.  · Check your blood pressure at the same time every day.  · Take your monitor to your next doctor's appointment. Your doctor will:  ? Make sure you are using it correctly.  ? Make sure it is working right.  · Make sure you understand what your blood pressure numbers should be.  · Tell your doctor if your medicine is causing side effects.  · Keep all follow-up visits as told by your doctor. This is important.  General tips:  · You will need a blood pressure machine, or monitor. Your doctor can suggest a monitor. You can buy one at a [facility] or online. When choosing one:  ? Choose one with an arm cuff.  ? Choose one that wraps around your upper arm. Only one finger " should fit between your arm and the cuff.  ? Do not choose one that measures your blood pressure from your wrist or finger.  Where to find more information  American Heart Association: www.heart.org  Contact a doctor if:  · Your blood pressure keeps being high.  Get help right away if:  · Your first blood pressure number is higher than 180.  · Your second blood pressure number is higher than 120.  Summary  · Check your blood pressure at the same time every day.  · Avoid caffeine, alcohol, smoking, and exercise for 30 minutes before checking your blood pressure.  · Make sure you understand what your blood pressure numbers should be.  This information is not intended to replace advice given to you by your health care provider. Make sure you discuss any questions you have with your health care provider.  Document Revised: 12/11/2020 Document Reviewed: 12/11/2020  Elsevier Patient Education © 2021 [employer] Inc.

## 2021-03-29 NOTE — PROGRESS NOTES
Subjective   Ghazala Early is a 41 y.o. female.     History of Present Illness     41-year-old female who is a new patient to me presents today for annual physical exam and to discuss left hip pain.    Diet/exercise: BMI 39.5.  Working on better routine; protein shakes for breakfast/lunch;     Social history: Never smoker 1 alcoholic drink per week or no drug use    Sexual history: Sexually active with ; no birth control; uterine ablation done a couple of years ago (h/o anemia 2/2 heavy periods).     Patient complains of left hip pain. The symptoms began several months ago.  Pain is a result of no known event. Pain is located hip region. Discomfort is described as throbbing. Symptoms are exacerbated by sitting for prolonged periods of time while leaning forward.  Evaluation to date: none. Therapy to date includes: home exercises which are temporarily effective    BP today 143/103 with HR 94. Manual recheck 135/85.    Sleep: Bedtime - 2230, sleep latency - with Benadryl and Melatonin 15 min (without it hours), wake time - 0630, non restorative sleep - Y, snoring - Y, witnessed apneas - N, nighttime awakening secondary to coughing/choking/gasping for air-Y, kicking legs-Y, morning headaches-N, dry mouth-N, urge sensations-Y WORSE AT NIGHT AND MOVEMENT HELPS, excessive sweating-SOMETIMES, teeth grinding-Y, feel more sleepy after getting more sleep-UNSURE, family history of ABENA-Y.    Diagnosed with adult ADHD. Was on Focalin in the past; tried the others which did not help (caused jitteriness). Stopped taking Focalin when she was in [place].     No flowsheet data found.    The following portions of the patient's history were reviewed and updated as appropriate: allergies, current medications, past family history, past medical history, past social history, past surgical history and problem list.    Review of Systems   Constitutional: Positive for fatigue. Negative for activity change, appetite change, chills and  fever.   HENT: Negative for congestion, sinus pressure and sore throat.    Eyes: Negative for blurred vision and double vision.   Respiratory: Negative for cough, chest tightness and shortness of breath.    Cardiovascular: Negative for chest pain.   Gastrointestinal: Negative for abdominal pain, blood in stool, constipation and diarrhea.   Genitourinary: Negative for dysuria.   Musculoskeletal: Positive for arthralgias.   Neurological: Negative for dizziness and headache.   Psychiatric/Behavioral: Positive for decreased concentration and sleep disturbance. The patient is not nervous/anxious.        Objective   Physical Exam  Constitutional:       Appearance: She is well-developed.   HENT:      Head: Normocephalic and atraumatic.      Right Ear: External ear normal.      Left Ear: External ear normal.      Nose: Nose normal.   Eyes:      Conjunctiva/sclera: Conjunctivae normal.      Pupils: Pupils are equal, round, and reactive to light.   Cardiovascular:      Rate and Rhythm: Normal rate and regular rhythm.   Pulmonary:      Effort: Pulmonary effort is normal. No respiratory distress.      Breath sounds: Normal breath sounds. No stridor.   Abdominal:      General: Bowel sounds are normal. There is no distension.      Palpations: Abdomen is soft.      Tenderness: There is no abdominal tenderness.   Musculoskeletal:      Cervical back: Normal range of motion.   Skin:     General: Skin is warm.   Neurological:      Mental Status: She is alert and oriented to person, place, and time.   Psychiatric:         Behavior: Behavior normal.           No results found for: COLORU, CLARITYU, SPECGRAV, PHUR, LEUKOCYTESUR, NITRITE, PROTEINPOCUA, GLUCOSEUR, KETONESU, UROBILINOGEN, BILIRUBINUR, RBCUR      Assessment/Plan     Diagnoses and all orders for this visit:    1. Annual physical exam (Primary)  -     CBC & Differential  -     Comprehensive Metabolic Panel  -     Hemoglobin A1c  -     Hepatitis C Antibody  -     Lipid Panel  -      TSH Rfx On Abnormal To Free T4    2. Chronic left hip pain  -     XR Hip With or Without Pelvis 2 - 3 View Left  -     Ambulatory Referral to Physical Therapy Evaluate and treat    3. Hypersomnia  -     Home Sleep Study; Future    4. Non-restorative sleep  -     Home Sleep Study; Future    5. Obesity (BMI 30-39.9)  -     Home Sleep Study; Future    6. Restless legs  -     Home Sleep Study; Future  -     Ferritin          Low glycemic index diet  Exercise 30 minutes most days of the week  Make sure you get results on any labs or tests we ordered today  We discussed medications and how to take them as prescribed  Sleep 6-8 hours each night if possible  If you have not signed up for [employer], please activate your code ASAP from your After Visit Summary today    LDL goal <100  LDL goal if heart disease <70  HDL goal >60  Triglyceride goal <150  BP goal =<130/80  Fasting glucose <100

## 2021-03-30 LAB
ALBUMIN SERPL-MCNC: 4.5 G/DL (ref 3.5–5.2)
ALBUMIN/GLOB SERPL: 1.8 G/DL
ALP SERPL-CCNC: 104 U/L (ref 39–117)
ALT SERPL-CCNC: 21 U/L (ref 1–33)
AST SERPL-CCNC: 19 U/L (ref 1–32)
BASOPHILS # BLD AUTO: 0.06 10*3/MM3 (ref 0–0.2)
BASOPHILS NFR BLD AUTO: 0.7 % (ref 0–1.5)
BILIRUB SERPL-MCNC: 0.5 MG/DL (ref 0–1.2)
BUN SERPL-MCNC: 12 MG/DL (ref 6–20)
BUN/CREAT SERPL: 13.5 (ref 7–25)
CALCIUM SERPL-MCNC: 9.3 MG/DL (ref 8.6–10.5)
CHLORIDE SERPL-SCNC: 102 MMOL/L (ref 98–107)
CHOLEST SERPL-MCNC: 178 MG/DL (ref 0–200)
CO2 SERPL-SCNC: 26 MMOL/L (ref 22–29)
CREAT SERPL-MCNC: 0.89 MG/DL (ref 0.57–1)
EOSINOPHIL # BLD AUTO: 0.14 10*3/MM3 (ref 0–0.4)
EOSINOPHIL NFR BLD AUTO: 1.7 % (ref 0.3–6.2)
ERYTHROCYTE [DISTWIDTH] IN BLOOD BY AUTOMATED COUNT: 14.2 % (ref 12.3–15.4)
FERRITIN SERPL-MCNC: 28.4 NG/ML (ref 13–150)
GLOBULIN SER CALC-MCNC: 2.5 GM/DL
GLUCOSE SERPL-MCNC: 97 MG/DL (ref 65–99)
HBA1C MFR BLD: 4.5 % (ref 4.8–5.6)
HCT VFR BLD AUTO: 43.8 % (ref 34–46.6)
HCV AB S/CO SERPL IA: <0.1 S/CO RATIO (ref 0–0.9)
HDLC SERPL-MCNC: 30 MG/DL (ref 40–60)
HGB BLD-MCNC: 14.6 G/DL (ref 12–15.9)
IMM GRANULOCYTES # BLD AUTO: 0.03 10*3/MM3 (ref 0–0.05)
IMM GRANULOCYTES NFR BLD AUTO: 0.4 % (ref 0–0.5)
LDLC SERPL CALC-MCNC: 104 MG/DL (ref 0–100)
LYMPHOCYTES # BLD AUTO: 1.41 10*3/MM3 (ref 0.7–3.1)
LYMPHOCYTES NFR BLD AUTO: 17.6 % (ref 19.6–45.3)
MCH RBC QN AUTO: 28.3 PG (ref 26.6–33)
MCHC RBC AUTO-ENTMCNC: 33.3 G/DL (ref 31.5–35.7)
MCV RBC AUTO: 85 FL (ref 79–97)
MONOCYTES # BLD AUTO: 0.39 10*3/MM3 (ref 0.1–0.9)
MONOCYTES NFR BLD AUTO: 4.9 % (ref 5–12)
NEUTROPHILS # BLD AUTO: 5.98 10*3/MM3 (ref 1.7–7)
NEUTROPHILS NFR BLD AUTO: 74.7 % (ref 42.7–76)
NRBC BLD AUTO-RTO: 0 /100 WBC (ref 0–0.2)
PLATELET # BLD AUTO: 235 10*3/MM3 (ref 140–450)
POTASSIUM SERPL-SCNC: 3.7 MMOL/L (ref 3.5–5.2)
PROT SERPL-MCNC: 7 G/DL (ref 6–8.5)
RBC # BLD AUTO: 5.15 10*6/MM3 (ref 3.77–5.28)
SODIUM SERPL-SCNC: 141 MMOL/L (ref 136–145)
TRIGL SERPL-MCNC: 255 MG/DL (ref 0–150)
TSH SERPL DL<=0.005 MIU/L-ACNC: 3 UIU/ML (ref 0.27–4.2)
VLDLC SERPL CALC-MCNC: 44 MG/DL (ref 5–40)
WBC # BLD AUTO: 8.01 10*3/MM3 (ref 3.4–10.8)

## 2021-03-30 NOTE — PROGRESS NOTES
All labs essentially normal except for mild elevations in LDL and triglycerides; HDL could be higher which is your good cholesterol.  Recommend diet and exercise to improve these numbers.    Ferritin is in the normal range however less than 45 therefore could be contributing to restless leg symptoms.  Recommend iron supplementation such as ferrous sulfate 325 mg once a day with plans to recheck ferritin in 3 months.  Iron supplements can cause GI upset/constipation; take with orange juice, increase fiber intake to prevent constipation.  If she has any adverse effects to the iron supplement let me know and we can try a different formulation if needed.

## 2021-04-01 ENCOUNTER — TREATMENT (OUTPATIENT)
Dept: PHYSICAL THERAPY | Facility: CLINIC | Age: 41
End: 2021-04-01

## 2021-04-01 DIAGNOSIS — M54.10 RADICULAR PAIN OF LEFT LOWER EXTREMITY: ICD-10-CM

## 2021-04-01 DIAGNOSIS — S39.012D STRAIN OF LUMBAR REGION, SUBSEQUENT ENCOUNTER: Primary | ICD-10-CM

## 2021-04-01 PROCEDURE — 97530 THERAPEUTIC ACTIVITIES: CPT | Performed by: PHYSICAL THERAPIST

## 2021-04-01 PROCEDURE — 97161 PT EVAL LOW COMPLEX 20 MIN: CPT | Performed by: PHYSICAL THERAPIST

## 2021-04-01 PROCEDURE — 97110 THERAPEUTIC EXERCISES: CPT | Performed by: PHYSICAL THERAPIST

## 2021-04-01 NOTE — PROGRESS NOTES
3051 - Patient in bed watching tv at this time. A/O x 4. IV to left FA  intact and patent. Patient has hemovac to lower back. TEDS and SCDS to BLE. ABD and tape dressing to lower back CDI. Patuent denies numbness/tingling. Pedal pulses palpable. Lungs clear. Bowel sounds active to all quadrants. Patient able to get to 2200 on the incentive spirometer. Pain 2/10.     0900-Patient ambulated to bathroom with walker. Patient urinated 350 mL clear yellow uriine. 1048 - Pain 5/10. PRN 1 tablet 5-325 percocet pain medication administered at this time. Patient has been educated on side effects. 1106-Discharge instructions reviewed with patient at this time. Opportunity for questions and clarification was provided. Patient has verbalized understanding. Patient was provided with care notes to include side effects of RX's. Arm bands removed and shredded. AVS reviewed with Gisel hurtado Rn.      IV removed. Dressing CDI. Physical Therapy Initial Evaluation and Plan of Care    Patient: Ghazala Early   : 1980  Diagnosis/ICD-10 Code:  Strain of lumbar region, subsequent encounter [S39.012D]  Referring practitioner: Brittany Givens MD  PMx Reviewed : 2021    Subjective Evaluation    History of Present Illness  Mechanism of injury: Pt presents to PT with (L) hip pain that wraps from the back to the front.  It also radiates down the anterior (L) thigh to the knee and occasional back pain.      The pt's symptoms started 1 years ago, with insidious onset.  Pt denies any hx of back injury or back pain.       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      Quality of life: excellent    Pain  Current pain rating: 3  At best pain ratin  At worst pain ratin  Location: (L) hip w/ back pain & (L) thigh pain  Quality: sharp, burning, pulling and dull ache  Relieving factors: rest, relaxation and medications (laying down)  Aggravating factors: standing and prolonged positioning (sitting leaning forward, putting on shoes)  Progression: no change    Social Support  Lives in: multiple-level home  Lives with: spouse and adult children    Hand dominance: right             Objective          Active Range of Motion     Additional Active Range of Motion Details  Lumbar AROM (% of normal)  Flexion: 50% pain  Extension: 75%  Lateral (L): 50%  Lateral (R): 75%  Rotation (L): 50% pain  Rotation (R): 50%        Strength/Myotome Testing     Left Hip   Planes of Motion   Flexion: 4+  Abduction: 4  External rotation: 4+  Internal rotation: 4+    Right Hip   Planes of Motion   Flexion: 5  Abduction: 5  External rotation: 4+  Internal rotation: 4+    Left Knee   Flexion: 5  Extension: 5    Right Knee   Flexion: 5  Extension: 5    Left Ankle/Foot    Dorsiflexion: 5  Plantar flexion: 5  Eversion: 5  Great toe extension: 5    Right Ankle/Foot   Dorsiflexion: 5  Plantar flexion: 5  Eversion: 5  Great toe extension: 5          Assessment & Plan     Assessment  Impairments: abnormal or restricted ROM, activity intolerance, impaired physical strength, lacks appropriate home exercise program and pain with function  Assessment details: Pt presents to PT with symptoms consistent with (L) Hip & Radicular Symptoms into the (L) hip & LE.  Pt would benefit from skilled PT intervention to address the deficits noted.     Prognosis: good  Functional Limitations: carrying objects, lifting, sleeping, uncomfortable because of pain, sitting, standing and unable to perform repetitive tasks  Goals  Plan Goals: SHORT TERM GOALS: Time for Goal Achievement: 4 weeks    1.  Patient to be compliant and progression of HEP                             2.  Pain level < 6/10 at worst with mentioned activities to improve function  3.  Increased thoracic, lumbar and SIJ mobility to allow for increased lumbar AROM with less pain.  4.  Increased lumbar AROM to by 25% in all planes to allow for increased ease with sit-stand transfers and functional activities    LONG TERM GOALS: Time for Goal Achievement: 2 months  1.  Pt. to score < 20% on Back Index  2.  Pain level < 3/10 with all listed activities to return to normal.  3.  Lumbar AROM to WFL to allow for return to household & recreational activities w/o increased symptoms  4.  (B) LE and lower abdominal strength to 5/5 to allow for pushing, pulling and activities to occur without pain (driving, sitting, household, exercises & Job requirements).        Plan  Therapy options: will be seen for skilled physical therapy services  Planned modality interventions: cryotherapy, electrical stimulation/Russian stimulation, TENS, thermotherapy (hydrocollator packs), ultrasound and dry needling  Planned therapy interventions: body mechanics training,  flexibility, functional ROM exercises, home exercise program, manual therapy, neuromuscular re-education, postural training, spinal/joint mobilization, stretching, strengthening, soft tissue mobilization and therapeutic activities  Frequency: 2x week  Duration in visits: 16  Treatment plan discussed with: patient        Manual Therapy:          mins  81415;  Therapeutic Exercise:     15     mins  38139;     Neuromuscular Ilene:         mins  88247;    Therapeutic Activity:      15     mins  41208;     Gait Training:            mins  61063;     Ultrasound:           mins  01974;    Electrical Stimulation:          mins  11348 ( );  Dry Needling           mins self-pay  Traction           mins 83236  Canalith Repositioning         mins 40505      Timed Treatment:   30   mins   Total Treatment:     60   mins    PT SIGNATURE: Johny Kaur PT   [place] Lic #[id number]    DATE TREATMENT INITIATED: 4/1/2021    Initial Certification     Certification Period: 6/30/2021  I certify that the therapy services are furnished while this patient is under my care.  The services outlined above are required by this patient, and will be reviewed every 90 days.     PHYSICIAN: Brittany Givens MD      DATE:     Please sign and return via fax to [phone]. Thank you, [facility].

## 2021-04-05 ENCOUNTER — IMMUNIZATION (OUTPATIENT)
Dept: VACCINE CLINIC | Facility: HOSPITAL | Age: 41
End: 2021-04-05

## 2021-04-05 ENCOUNTER — TREATMENT (OUTPATIENT)
Dept: PHYSICAL THERAPY | Facility: CLINIC | Age: 41
End: 2021-04-05

## 2021-04-05 DIAGNOSIS — M54.10 RADICULAR PAIN OF LEFT LOWER EXTREMITY: ICD-10-CM

## 2021-04-05 DIAGNOSIS — S39.012D STRAIN OF LUMBAR REGION, SUBSEQUENT ENCOUNTER: Primary | ICD-10-CM

## 2021-04-05 PROCEDURE — 97530 THERAPEUTIC ACTIVITIES: CPT | Performed by: PHYSICAL THERAPIST

## 2021-04-05 PROCEDURE — 91300 HC SARSCOV02 VAC 30MCG/0.3ML IM: CPT | Performed by: INTERNAL MEDICINE

## 2021-04-05 PROCEDURE — 0002A: CPT | Performed by: INTERNAL MEDICINE

## 2021-04-05 PROCEDURE — 97110 THERAPEUTIC EXERCISES: CPT | Performed by: PHYSICAL THERAPIST

## 2021-04-05 NOTE — PROGRESS NOTES
Physical Therapy Daily Progress Note  Visit # 2           Patient: Ghazala Early   : 1980  Diagnosis/ICD-10 Code:  Strain of lumbar region, subsequent encounter [S39.012D]  Referring practitioner: Brittany Givens MD  Date of Initial Evaluation:  Type: THERAPY  Noted: 2021      Subjective  Ghazala Early reports:   It still hurts, but it's a different kind of hurt.  I'm feeling less pain in my (L) hip and more in my back now.  I did have some pain while in the car driving here.        Objective   See Exercise, Manual, and Modality Logs for complete treatment.     Reviewed current HEP, progressed therex program with exercises as noted.       Assessment & Plan     Assessment  Assessment details: Tolerated continued progression of therapeutic exercise and therapeutic activity well today, no increased pain reported during or after session.              Progress per Plan of Care and Progress strengthening /stabilization /functional activity, HEP as tolerated.           Timed:         Manual Therapy:         mins  71110     Therapeutic Exercise:     30    mins  36850     Neuromuscular Ilene:        mins  54850    Therapeutic Activity:      14    mins  51881     Gait Training:           mins  33553     Ultrasound:          mins  05214    Ionto                                   mins  82759  Self Care                            mins  90077    Un-Timed:  Electrical Stimulation:         mins 43405 ( )  Traction          mins 29772    Timed Treatment:   44   mins   Total Treatment:     49   mins    CORIE Borrego License #[id number]  Physical Therapist Assistant

## 2021-04-08 ENCOUNTER — TREATMENT (OUTPATIENT)
Dept: PHYSICAL THERAPY | Facility: CLINIC | Age: 41
End: 2021-04-08

## 2021-04-08 DIAGNOSIS — S39.012D STRAIN OF LUMBAR REGION, SUBSEQUENT ENCOUNTER: Primary | ICD-10-CM

## 2021-04-08 DIAGNOSIS — M54.10 RADICULAR PAIN OF LEFT LOWER EXTREMITY: ICD-10-CM

## 2021-04-08 PROCEDURE — 97530 THERAPEUTIC ACTIVITIES: CPT | Performed by: PHYSICAL THERAPIST

## 2021-04-08 PROCEDURE — 97110 THERAPEUTIC EXERCISES: CPT | Performed by: PHYSICAL THERAPIST

## 2021-04-08 NOTE — PROGRESS NOTES
Physical Therapy Daily Progress Note  Visit # 3           Patient: Ghazala Early   : 1980  Diagnosis/ICD-10 Code:  Strain of lumbar region, subsequent encounter [S39.012D]  Referring practitioner: Brittany Givens MD  Date of Initial Evaluation:  Type: THERAPY  Noted: 2021      Subjective  Ghazala Early reports:   I'm feeling better today.  I have some tightness in my back but no sharp pain.      t still hurts, but it's a different kind of hurt.  I'm feeling less pain in my (L) hip and more in my back now.  I did have some pain while in the car driving here.        Objective   See Exercise, Manual, and Modality Logs for complete treatment.       Reviewed current HEP, progressed therex program with exercises as noted.  Added clamshell, multifidus lift to HEP, written instructions issued (Unified Office code [id number]).        Assessment & Plan     Assessment  Assessment details: Tolerated continued progression of therapeutic exercise and therapeutic activity well today, no increased pain reported during or after session.  Progressing steadily toward STGs.          Goals  Plan Goals: SHORT TERM GOALS: Time for Goal Achievement: 4 weeks    1.  Patient to be compliant and progression of HEP                             2.  Pain level < 6/10 at worst with mentioned activities to improve function  3.  Increased thoracic, lumbar and SIJ mobility to allow for increased lumbar AROM with less pain.  4.  Increased lumbar AROM to by 25% in all planes to allow for increased ease with sit-stand transfers and functional activities             Progress per Plan of Care and Progress strengthening /stabilization /functional activity           Timed:         Manual Therapy:         mins  96801     Therapeutic Exercise:     34    mins  88148     Neuromuscular Ilene:        mins  46258    Therapeutic Activity:      12    mins  11999     Gait Training:           mins  82781     Ultrasound:          mins  81880    Ionto                                    mins  47455  Self Care                            mins  49350    Un-Timed:  Electrical Stimulation:         mins 70776 ( )  Traction          mins 99195    Timed Treatment:   46   mins   Total Treatment:     46   mins    CORIE Borrego License #[id number]  Physical Therapist Assistant

## 2021-04-12 ENCOUNTER — TREATMENT (OUTPATIENT)
Dept: PHYSICAL THERAPY | Facility: CLINIC | Age: 41
End: 2021-04-12

## 2021-04-12 DIAGNOSIS — S39.012D STRAIN OF LUMBAR REGION, SUBSEQUENT ENCOUNTER: Primary | ICD-10-CM

## 2021-04-12 DIAGNOSIS — M54.10 RADICULAR PAIN OF LEFT LOWER EXTREMITY: ICD-10-CM

## 2021-04-12 PROCEDURE — 97530 THERAPEUTIC ACTIVITIES: CPT | Performed by: PHYSICAL THERAPIST

## 2021-04-12 PROCEDURE — 97110 THERAPEUTIC EXERCISES: CPT | Performed by: PHYSICAL THERAPIST

## 2021-04-12 PROCEDURE — G0283 ELEC STIM OTHER THAN WOUND: HCPCS | Performed by: PHYSICAL THERAPIST

## 2021-04-12 NOTE — PROGRESS NOTES
Physical Therapy Daily Progress Note  Visit # 4           Patient: Ghazala Early   : 1980  Diagnosis/ICD-10 Code:  Strain of lumbar region, subsequent encounter [S39.012D]  Referring practitioner: Brittany Givens MD  Date of Initial Evaluation:  Type: THERAPY  Noted: 2021      Subjective  Ghazala Early reports:   I felt pretty good throughout the weekend, but I am having more pain today.  Feels like I slept wrong last night, I am also having some cramping today which doesn't help.  I had some aching in my hip while driving here, but the pain is only in the center of my low back right now.          Objective   See Exercise, Manual, and Modality Logs for complete treatment.     Concluded with trial application IFC to (R) lumbar area, pt prone with pillow under hips x 10 min as noted.       Assessment & Plan     Assessment  Assessment details: Tolerated therapeutic exercise/therapeutic activity well today, no increased pain reported during or after session, does note persistent, unchanging (R) side lumbar pain.  [place] of IFC to address unchanging pain,       Goals  Plan Goals: SHORT TERM GOALS: Time for Goal Achievement: 4 weeks    1.  Patient to be compliant and progression of HEP                             2.  Pain level < 6/10 at worst with mentioned activities to improve function  3.  Increased thoracic, lumbar and SIJ mobility to allow for increased lumbar AROM with less pain.  4.  Increased lumbar AROM to by 25% in all planes to allow for increased ease with sit-stand transfers and functional activities           Progress per Plan of Care and Progress strengthening /stabilization /functional activity           Timed:         Manual Therapy:         mins  16618     Therapeutic Exercise:     30    mins  03326     Neuromuscular Ilene:        mins  75220    Therapeutic Activity:      12    mins  33793     Gait Training:           mins  85506     Ultrasound:          mins  29442    Ionto                                    mins  57871  Self Care                            mins  59607    Un-Timed:  Electrical Stimulation:      10   mins 94364 ( )  Traction          mins 06409    Timed Treatment:   52   mins   Total Treatment:     55   mins    CORIE Borrego License #[id number]  Physical Therapist Assistant

## 2021-04-15 ENCOUNTER — TREATMENT (OUTPATIENT)
Dept: PHYSICAL THERAPY | Facility: CLINIC | Age: 41
End: 2021-04-15

## 2021-04-15 DIAGNOSIS — S39.012D STRAIN OF LUMBAR REGION, SUBSEQUENT ENCOUNTER: Primary | ICD-10-CM

## 2021-04-15 DIAGNOSIS — M54.10 RADICULAR PAIN OF LEFT LOWER EXTREMITY: ICD-10-CM

## 2021-04-15 PROCEDURE — 97530 THERAPEUTIC ACTIVITIES: CPT | Performed by: PHYSICAL THERAPIST

## 2021-04-15 PROCEDURE — G0283 ELEC STIM OTHER THAN WOUND: HCPCS | Performed by: PHYSICAL THERAPIST

## 2021-04-15 PROCEDURE — 97110 THERAPEUTIC EXERCISES: CPT | Performed by: PHYSICAL THERAPIST

## 2021-04-15 NOTE — PROGRESS NOTES
Physical Therapy Daily Progress Note  Visit # 5           Patient: Ghazala Early   : 1980  Diagnosis/ICD-10 Code:  Strain of lumbar region, subsequent encounter [S39.012D]  Referring practitioner: Brittany Givens MD  Date of Initial Evaluation:  Type: THERAPY  Noted: 2021      Subjective  Ghazala Early reports:   I went for a walk later in the day after our last session, afterward I felt like I strained my back more on the (L) side.  The pain was 8/10 at the worst.  It hurt throughout the next day.  I put ice on it regularly and stopped exercises for one day, resumed yesterday.  Today it feels tight but not painful.        Objective   See Exercise, Manual, and Modality Logs for complete treatment.     Concluded with IFC to (R) lumbar area, pt prone with pillow under hips x 15 min as noted.       Assessment & Plan     Assessment  Assessment details: Tolerated therapeutic exercise/therapeutic activity well today, no increased pain reported after session, continued with IFC to address persistent (L) sided lumbar pain.      Goals  Plan Goals: SHORT TERM GOALS: Time for Goal Achievement: 4 weeks    1.  Patient to be compliant and progression of HEP - PROGRESSING                             2.  Pain level < 6/10 at worst with mentioned activities to improve function - NOT MET    3.  Increased thoracic, lumbar and SIJ mobility to allow for increased lumbar AROM with less pain - PROGRESSING   4.  Increased lumbar AROM to by 25% in all planes to allow for increased ease with sit-stand transfers and functional activities - PROGRESSING            Progress per Plan of Care and Progress strengthening /stabilization /functional activity           Timed:         Manual Therapy:         mins  59688     Therapeutic Exercise:     30    mins  63976     Neuromuscular Ilene:        mins  10109    Therapeutic Activity:      12    mins  45216     Gait Training:           mins  64636     Ultrasound:          mins  16850     Ionto                                   mins  15944  Self Care                            mins  11743    Un-Timed:  Electrical Stimulation:      15   mins 88804 ( )  Traction          mins 32615    Timed Treatment:   57   mins   Total Treatment:     57   mins    CORIE Borrego License #[id number]  Physical Therapist Assistant

## 2021-04-19 ENCOUNTER — TREATMENT (OUTPATIENT)
Dept: PHYSICAL THERAPY | Facility: CLINIC | Age: 41
End: 2021-04-19

## 2021-04-19 DIAGNOSIS — M54.10 RADICULAR PAIN OF LEFT LOWER EXTREMITY: ICD-10-CM

## 2021-04-19 DIAGNOSIS — S39.012D STRAIN OF LUMBAR REGION, SUBSEQUENT ENCOUNTER: Primary | ICD-10-CM

## 2021-04-19 PROCEDURE — 97530 THERAPEUTIC ACTIVITIES: CPT | Performed by: PHYSICAL THERAPIST

## 2021-04-19 PROCEDURE — 97140 MANUAL THERAPY 1/> REGIONS: CPT | Performed by: PHYSICAL THERAPIST

## 2021-04-19 PROCEDURE — 97110 THERAPEUTIC EXERCISES: CPT | Performed by: PHYSICAL THERAPIST

## 2021-04-19 NOTE — PROGRESS NOTES
Physical Therapy Daily Progress Note  Visit: 6    Ghazala Early reports: I am doing a little better with my back but I strained my (L) calf over the weekend.      Subjective     Objective   See Exercise, Manual, and Modality Logs for complete treatment.       Assessment & Plan     Assessment  Assessment details: The pt catie Tx well with the intro of MT to the lumbosacral region.  Cont to work on mobility.      Plan  Plan details: Progress ROM / strengthening / stabilization / functional activity as tolerated          Manual Therapy:     12     mins  39879;  Therapeutic Exercise:     35     mins  75018;     Neuromuscular Ilene:         mins  07045;    Therapeutic Activity:      15     mins  56885;     Gait Training:            mins  63875;     Ultrasound:           mins  07523;    Electrical Stimulation:          mins  21869 ( );  Dry Needling           mins self-pay  Traction           mins 95294  Canalith Repositioning         mins 52463      Timed Treatment:   62   mins   Total Treatment:     62   mins    CHARLY Love License #: [id number]    Physical Therapist

## 2021-04-22 ENCOUNTER — TREATMENT (OUTPATIENT)
Dept: PHYSICAL THERAPY | Facility: CLINIC | Age: 41
End: 2021-04-22

## 2021-04-22 DIAGNOSIS — M54.10 RADICULAR PAIN OF LEFT LOWER EXTREMITY: ICD-10-CM

## 2021-04-22 DIAGNOSIS — S39.012D STRAIN OF LUMBAR REGION, SUBSEQUENT ENCOUNTER: Primary | ICD-10-CM

## 2021-04-22 PROCEDURE — 97140 MANUAL THERAPY 1/> REGIONS: CPT | Performed by: PHYSICAL THERAPIST

## 2021-04-22 PROCEDURE — 97530 THERAPEUTIC ACTIVITIES: CPT | Performed by: PHYSICAL THERAPIST

## 2021-04-22 PROCEDURE — 97110 THERAPEUTIC EXERCISES: CPT | Performed by: PHYSICAL THERAPIST

## 2021-04-22 NOTE — PROGRESS NOTES
Physical Therapy Daily Progress Note  Visit: 7    Ghazala Early reports: My back felt better for a couple days after my last visit.  My (L) hip is hurting this morning.      Subjective     Objective   See Exercise, Manual, and Modality Logs for complete treatment.       Assessment & Plan     Assessment  Assessment details: The pt jimmy improving symptoms sp MT.  Worked on lumbosacral stability & strengthening.      Plan  Plan details: Progress ROM / strengthening / stabilization / functional activity as tolerated          Manual Therapy:     12     mins  08654;  Therapeutic Exercise:     35     mins  79030;     Neuromuscular Ilene:         mins  93019;    Therapeutic Activity:      15     mins  83870;     Gait Training:            mins  07319;     Ultrasound:           mins  75969;    Electrical Stimulation:          mins  50749 ( );  Dry Needling           mins self-pay  Traction           mins 30985  Canalith Repositioning         mins 14920      Timed Treatment:   62   mins   Total Treatment:     62   mins    Johny Kaur PT  [place] License #: [id number]    Physical Therapist

## 2021-04-23 ENCOUNTER — APPOINTMENT (OUTPATIENT)
Dept: SLEEP MEDICINE | Facility: HOSPITAL | Age: 41
End: 2021-04-23

## 2021-04-26 ENCOUNTER — TREATMENT (OUTPATIENT)
Dept: PHYSICAL THERAPY | Facility: CLINIC | Age: 41
End: 2021-04-26

## 2021-04-26 ENCOUNTER — TELEPHONE (OUTPATIENT)
Dept: FAMILY MEDICINE CLINIC | Facility: CLINIC | Age: 41
End: 2021-04-26

## 2021-04-26 DIAGNOSIS — S39.012D STRAIN OF LUMBAR REGION, SUBSEQUENT ENCOUNTER: Primary | ICD-10-CM

## 2021-04-26 DIAGNOSIS — M54.10 RADICULAR PAIN OF LEFT LOWER EXTREMITY: ICD-10-CM

## 2021-04-26 DIAGNOSIS — R42 DIZZINESS: Primary | ICD-10-CM

## 2021-04-26 PROCEDURE — 97110 THERAPEUTIC EXERCISES: CPT | Performed by: PHYSICAL THERAPIST

## 2021-04-26 RX ORDER — MECLIZINE HCL 12.5 MG/1
12.5 TABLET ORAL 3 TIMES DAILY PRN
Qty: 45 TABLET | Refills: 1 | Status: SHIPPED | OUTPATIENT
Start: 2021-04-26

## 2021-04-26 NOTE — TELEPHONE ENCOUNTER
Caller: Ghazala Early    Relationship: Self    Best call back number: [phone]    Medication needed:   Requested Prescriptions     Pending Prescriptions Disp Refills   • meclizine (ANTIVERT) 12.5 MG tablet       Sig: Take 1 tablet by mouth 3 (Three) Times a Day As Needed for Dizziness.       When do you need the refill by: ASAP    What additional details did the patient provide when requesting the medication: PATIENT IS OUT OF MEDICATION     Does the patient have less than a 3 day supply:  [x] Yes  [] No    What is the patient's preferred pharmacy: [id number] [address] AT [facility] - [phone] [facility] [phone] FX

## 2021-04-26 NOTE — TELEPHONE ENCOUNTER
Patient says you stated you would fill it at last visit and it was never sent over; she uses it for a variety of things car rides, plane trips, and is out. Yesterday was in car and took last one; please send over please

## 2021-04-26 NOTE — PROGRESS NOTES
Physical Therapy Daily Progress Note  Visit # 8           Patient: Ghazala Early   : 1980  Diagnosis/ICD-10 Code:  Strain of lumbar region, subsequent encounter [S39.012D]  Referring practitioner: Brittany Givens MD  Date of Initial Evaluation:  Type: THERAPY  Noted: 2021      Subjective  Ghazala Early reports:   Walked x3 miles over the weekend, felt good.  No c/o this am, feeling some tightness but no pain.       Objective   See Exercise, Manual, and Modality Logs for complete treatment.       Assessment & Plan     Assessment  Assessment details: Tolerated continued progression of therapeutic exercise/therapeutic activity well today, no increased pain reported during or after exercises.  Would continue to benefit from skilled PT progressing with functional WB and strength.            Progress per Plan of Care and Progress strengthening /stabilization /functional activity           Timed:         Manual Therapy:         mins  81685     Therapeutic Exercise:     45    mins  29667     Neuromuscular Ilene:        mins  21260    Therapeutic Activity:          mins  51875     Gait Training:           mins  35478     Ultrasound:          mins  64200    Ionto                                   mins  91976  Self Care                            mins  91547    Un-Timed:  Electrical Stimulation:         mins 58118 ( )  Traction          mins 13840    Timed Treatment:   45   mins   Total Treatment:     55   mins    CORIE Borrego License #[id number]  Physical Therapist Assistant

## 2021-04-26 NOTE — TELEPHONE ENCOUNTER
Please advise patient I need to know what she was prescribed it originally and her current symptoms.  Thanks

## 2021-04-29 ENCOUNTER — TREATMENT (OUTPATIENT)
Dept: PHYSICAL THERAPY | Facility: CLINIC | Age: 41
End: 2021-04-29

## 2021-04-29 DIAGNOSIS — S39.012D STRAIN OF LUMBAR REGION, SUBSEQUENT ENCOUNTER: Primary | ICD-10-CM

## 2021-04-29 DIAGNOSIS — M54.10 RADICULAR PAIN OF LEFT LOWER EXTREMITY: ICD-10-CM

## 2021-04-29 PROCEDURE — 97530 THERAPEUTIC ACTIVITIES: CPT | Performed by: PHYSICAL THERAPIST

## 2021-04-29 PROCEDURE — 97110 THERAPEUTIC EXERCISES: CPT | Performed by: PHYSICAL THERAPIST

## 2021-04-29 PROCEDURE — 97140 MANUAL THERAPY 1/> REGIONS: CPT | Performed by: PHYSICAL THERAPIST

## 2021-05-06 ENCOUNTER — TREATMENT (OUTPATIENT)
Dept: PHYSICAL THERAPY | Facility: CLINIC | Age: 41
End: 2021-05-06

## 2021-05-06 DIAGNOSIS — M54.10 RADICULAR PAIN OF LEFT LOWER EXTREMITY: ICD-10-CM

## 2021-05-06 DIAGNOSIS — S39.012D STRAIN OF LUMBAR REGION, SUBSEQUENT ENCOUNTER: Primary | ICD-10-CM

## 2021-05-06 PROCEDURE — 97140 MANUAL THERAPY 1/> REGIONS: CPT | Performed by: PHYSICAL THERAPIST

## 2021-05-06 PROCEDURE — 97530 THERAPEUTIC ACTIVITIES: CPT | Performed by: PHYSICAL THERAPIST

## 2021-05-06 PROCEDURE — 97110 THERAPEUTIC EXERCISES: CPT | Performed by: PHYSICAL THERAPIST

## 2021-05-10 ENCOUNTER — TREATMENT (OUTPATIENT)
Dept: PHYSICAL THERAPY | Facility: CLINIC | Age: 41
End: 2021-05-10

## 2021-05-10 DIAGNOSIS — S39.012D STRAIN OF LUMBAR REGION, SUBSEQUENT ENCOUNTER: Primary | ICD-10-CM

## 2021-05-10 DIAGNOSIS — M54.10 RADICULAR PAIN OF LEFT LOWER EXTREMITY: ICD-10-CM

## 2021-05-10 PROCEDURE — 97140 MANUAL THERAPY 1/> REGIONS: CPT | Performed by: PHYSICAL THERAPIST

## 2021-05-10 PROCEDURE — 97110 THERAPEUTIC EXERCISES: CPT | Performed by: PHYSICAL THERAPIST

## 2021-05-10 PROCEDURE — 97530 THERAPEUTIC ACTIVITIES: CPT | Performed by: PHYSICAL THERAPIST

## 2021-05-10 NOTE — PROGRESS NOTES
Physical Therapy Daily Progress Note  Visit: 11    Ghazala Early reports: I am feeling better overall.  I still have some pain, but much better overall.  I feel like I am 75% better overall.   I bought a hula hoop to work on my core.      Subjective     Objective   See Exercise, Manual, and Modality Logs for complete treatment.       Assessment & Plan     Assessment  Assessment details: Reviewed with the pt about engaging her core with Quad Kicks exercise.  Pt demos improving endurance for the exercises.  Discussed hula hooping at home - starting at 5 mins and progress from there to see how her back reacts.     Plan  Plan details: Progress ROM / strengthening / stabilization / functional activity as tolerated          Manual Therapy:     12     mins  15412;  Therapeutic Exercise:     35     mins  34577;     Neuromuscular Ilene:         mins  80072;    Therapeutic Activity:      10     mins  49701;     Gait Training:            mins  05249;     Ultrasound:           mins  10939;    Electrical Stimulation:          mins  20489 ( );  Dry Needling           mins self-pay  Traction           mins 25981  Canalith Repositioning         mins 36283      Timed Treatment:   57   mins   Total Treatment:     57   mins    Johny Kaur PT  [place] License #: [id number]    Physical Therapist

## 2021-05-14 ENCOUNTER — OFFICE VISIT (OUTPATIENT)
Dept: FAMILY MEDICINE CLINIC | Facility: CLINIC | Age: 41
End: 2021-05-14

## 2021-05-14 VITALS
HEIGHT: 66 IN | RESPIRATION RATE: 16 BRPM | TEMPERATURE: 97.7 F | SYSTOLIC BLOOD PRESSURE: 150 MMHG | BODY MASS INDEX: 39.37 KG/M2 | HEART RATE: 88 BPM | DIASTOLIC BLOOD PRESSURE: 93 MMHG | WEIGHT: 245 LBS | OXYGEN SATURATION: 99 %

## 2021-05-14 DIAGNOSIS — M25.552 CHRONIC LEFT HIP PAIN: Primary | ICD-10-CM

## 2021-05-14 DIAGNOSIS — G25.81 RESTLESS LEGS: ICD-10-CM

## 2021-05-14 DIAGNOSIS — G89.29 CHRONIC LEFT HIP PAIN: Primary | ICD-10-CM

## 2021-05-14 PROCEDURE — 99213 OFFICE O/P EST LOW 20 MIN: CPT | Performed by: FAMILY MEDICINE

## 2021-05-14 NOTE — PROGRESS NOTES
Subjective   Ghazala Early is a 41 y.o. female.     History of Present Illness     41-year-old female presents today for 6-week follow-up on back pain and hip pain.  X-rays were done at last visit which showed normal hip x-ray she was referred to PT.    Today patient reports some improvement in pain with PT however not complete.    Restless leg syndrome: Iron supplementation was started for low ferritin.  Today patient reports no restless leg symptoms; has been taking iron supplementation for 1 month.        No flowsheet data found.    The following portions of the patient's history were reviewed and updated as appropriate: allergies, current medications, past family history, past medical history, past social history, past surgical history and problem list.    Review of Systems   Constitutional: Negative for chills and fever.   HENT: Negative for congestion.    Respiratory: Negative for cough and shortness of breath.    Cardiovascular: Negative for chest pain, palpitations and leg swelling.   Gastrointestinal: Negative for abdominal pain, diarrhea and vomiting.   Musculoskeletal: Positive for arthralgias.       Objective   Physical Exam  Constitutional:       Appearance: She is well-developed.   HENT:      Head: Normocephalic and atraumatic.   Eyes:      Conjunctiva/sclera: Conjunctivae normal.      Pupils: Pupils are equal, round, and reactive to light.   Pulmonary:      Effort: Pulmonary effort is normal.   Musculoskeletal:      Cervical back: Normal range of motion and neck supple.           No results found for: COLORU, CLARITYU, SPECGRAV, PHUR, LEUKOCYTESUR, NITRITE, PROTEINPOCUA, GLUCOSEUR, KETONESU, UROBILINOGEN, BILIRUBINUR, RBCUR      Assessment/Plan     Diagnoses and all orders for this visit:    1. Chronic left hip pain (Primary)  -     Ambulatory Referral to Orthopedic Surgery    2. Restless legs  -     Ferritin; Future    Recheck ferritin in 2 months return to clinic in 6 months for follow-up or  sooner if needed.  She will reschedule home sleep study once she figures out cost with insurance.

## 2021-05-20 ENCOUNTER — TREATMENT (OUTPATIENT)
Dept: PHYSICAL THERAPY | Facility: CLINIC | Age: 41
End: 2021-05-20

## 2021-05-20 DIAGNOSIS — M54.10 RADICULAR PAIN OF LEFT LOWER EXTREMITY: ICD-10-CM

## 2021-05-20 DIAGNOSIS — S39.012D STRAIN OF LUMBAR REGION, SUBSEQUENT ENCOUNTER: Primary | ICD-10-CM

## 2021-05-20 PROCEDURE — 97110 THERAPEUTIC EXERCISES: CPT | Performed by: PHYSICAL THERAPIST

## 2021-05-20 PROCEDURE — 97530 THERAPEUTIC ACTIVITIES: CPT | Performed by: PHYSICAL THERAPIST

## 2021-05-20 PROCEDURE — 97140 MANUAL THERAPY 1/> REGIONS: CPT | Performed by: PHYSICAL THERAPIST

## 2021-06-15 ENCOUNTER — TREATMENT (OUTPATIENT)
Dept: PHYSICAL THERAPY | Facility: CLINIC | Age: 41
End: 2021-06-15

## 2021-06-15 DIAGNOSIS — M54.10 RADICULAR PAIN OF LEFT LOWER EXTREMITY: ICD-10-CM

## 2021-06-15 DIAGNOSIS — S39.012D STRAIN OF LUMBAR REGION, SUBSEQUENT ENCOUNTER: Primary | ICD-10-CM

## 2021-06-15 PROCEDURE — 97530 THERAPEUTIC ACTIVITIES: CPT | Performed by: PHYSICAL THERAPIST

## 2021-06-15 PROCEDURE — 97110 THERAPEUTIC EXERCISES: CPT | Performed by: PHYSICAL THERAPIST

## 2021-06-15 NOTE — PROGRESS NOTES
Physical Therapy Daily Progress Note / Discharge Note    Referring practitioner: Brittany Givens MD  Date of Initial Visit: Episode Type: THERAPY  Noted: 2021  Patient seen for 13 sessions.  ______________________________________________________________    Ghazala Early reports: I am feeling good.  I helped my parents move stuff up [place] and remodel their cabin in [place].  I was sore in my back afterwards but no increased pain.  I have been consistently walking through the week without issue.      Subjective Evaluation    Pain  At worst pain ratin  Location: Low Back           Objective          Active Range of Motion     Additional Active Range of Motion Details  Lumbar AROM (% of normal)  Flexion: 100%   Extension: 75%  Lateral (L): 100%  Lateral (R): 75%  Rotation (L): 100%   Rotation (R): 100%        Strength/Myotome Testing     Left Hip   Planes of Motion   Flexion: 4+  Abduction: 5  External rotation: 5  Internal rotation: 5    Right Hip   Planes of Motion   Flexion: 5  Abduction: 5  External rotation: 5  Internal rotation: 5    Left Knee   Flexion: 5  Extension: 5    Right Knee   Flexion: 5  Extension: 5    Left Ankle/Foot   Dorsiflexion: 5  Plantar flexion: 5  Eversion: 5  Great toe extension: 5    Right Ankle/Foot   Dorsiflexion: 5  Plantar flexion: 5  Eversion: 5  Great toe extension: 5      See Exercise, Manual, and Modality Logs for complete treatment.     Goals - MET  Plan Goals: SHORT TERM GOALS: Time for Goal Achievement: 4 weeks    1.  Patient to be compliant and progression of HEP                             2.  Pain level < 6/10 at worst with mentioned activities to improve function  3.  Increased thoracic, lumbar and SIJ mobility to allow for increased lumbar AROM with less pain.  4.  Increased lumbar AROM to by 25% in all planes to allow for increased ease with sit-stand transfers and functional activities    LONG TERM GOALS: Time for Goal Achievement: 2 months  1.  Pt. to score  < 20% on Back Index - NOT TESTED  2.  Pain level < 3/10 with all listed activities to return to normal.  3.  Lumbar AROM to WFL to allow for return to household & recreational activities w/o increased symptoms  4.  (B) LE and lower abdominal strength to 5/5 to allow for pushing, pulling and activities to occur without pain (driving, sitting, household, exercises & Job requirements).    Assessment & Plan     Assessment  Assessment details: Ghazala has improved overall with her back pain.  She has been able return to exercise, manual labor without irritation beyond expected.  Reviewed with the pt about continuing her HEP and walking program to help her back health.      Plan  Plan details: DC to HEP.  Pt instructed to call with questions or issues related to her injury.              Manual Therapy:          mins  50795;  Therapeutic Exercise:     35     mins  09987;     Neuromuscular Ilene:         mins  51777;    Therapeutic Activity:      15     mins  50300;     Gait Training:            mins  21714;     Ultrasound:           mins  70943;    Electrical Stimulation:          mins  86293 ( );  Dry Needling           mins self-pay  Traction           mins 09384  Canalith Repositioning         mins 28636      Timed Treatment:   50   mins   Total Treatment:     50   mins    Johny Kaur PT  [place] License #: [id number]    Physical Therapist

## 2021-06-18 ENCOUNTER — APPOINTMENT (OUTPATIENT)
Dept: WOMENS IMAGING | Facility: HOSPITAL | Age: 41
End: 2021-06-18

## 2021-06-18 PROCEDURE — G0279 TOMOSYNTHESIS, MAMMO: HCPCS | Performed by: RADIOLOGY

## 2021-06-18 PROCEDURE — 76641 ULTRASOUND BREAST COMPLETE: CPT | Performed by: RADIOLOGY

## 2021-06-18 PROCEDURE — 77062 BREAST TOMOSYNTHESIS BI: CPT | Performed by: RADIOLOGY

## 2021-06-18 PROCEDURE — 77066 DX MAMMO INCL CAD BI: CPT | Performed by: RADIOLOGY

## 2021-12-14 ENCOUNTER — IMMUNIZATION (OUTPATIENT)
Dept: VACCINE CLINIC | Facility: HOSPITAL | Age: 41
End: 2021-12-14

## 2021-12-14 PROCEDURE — 91300 HC SARSCOV02 VAC 30MCG/0.3ML IM: CPT | Performed by: INTERNAL MEDICINE

## 2021-12-14 PROCEDURE — 0004A HC ADM SARSCOV2 30MCG/0.3ML BOOSTER: CPT | Performed by: INTERNAL MEDICINE

## 2022-06-07 ENCOUNTER — APPOINTMENT (OUTPATIENT)
Dept: WOMENS IMAGING | Facility: HOSPITAL | Age: 42
End: 2022-06-07

## 2022-06-07 PROCEDURE — 77063 BREAST TOMOSYNTHESIS BI: CPT | Performed by: RADIOLOGY

## 2022-06-07 PROCEDURE — 77067 SCR MAMMO BI INCL CAD: CPT | Performed by: RADIOLOGY

## 2022-07-08 NOTE — PATIENT INSTRUCTIONS
Physical Therapy Discharge Summary    Reason for therapy discharge:    Discharged to transitional care facility.    Progress towards therapy goal(s). See goals on Care Plan in [facility] electronic health record for goal details.  Goals partially met.  Barriers to achieving goals:   limited tolerance for therapy and discharge from facility.    Therapy recommendation(s):    Continued therapy is recommended.  Rationale/Recommendations:  patient would benefit from skilled physical therapy intervention at the TCU to address weakness, impaired gait, impaired bed mobility and overall safety prior to returning home alone.      Thank you for your referral.  Anu Chua, PT, DPT, ATC, [facility]  O: [phone]  E: [email or web address]         Poison Ivy Dermatitis  Poison ivy dermatitis is inflammation of the skin that is caused by chemicals in the leaves of the poison ivy plant. The skin reaction often involves redness, swelling, blisters, and extreme itching.  What are the causes?  This condition is caused by a chemical (urushiol) found in the sap of the poison ivy plant. This chemical is sticky and can be easily spread to people, animals, and objects. You can get poison ivy dermatitis by:  · Having direct contact with a poison ivy plant.  · Touching animals, other people, or objects that have come in contact with poison ivy and have the chemical on them.  What increases the risk?  This condition is more likely to develop in people who:  · Are outdoors often in wooded or marshy areas.  · Go outdoors without wearing protective clothing, such as closed shoes, long pants, and a long-sleeved shirt.  What are the signs or symptoms?  Symptoms of this condition include:  · Redness of the skin.  · Extreme itching.  · A rash that often includes bumps and blisters. The rash usually appears 48 hours after exposure, if you have been exposed before. If this is the first time you have been exposed, the rash may not appear until a week after exposure.  · Swelling. This may occur if the reaction is more severe.  Symptoms usually last for 1-2 weeks. However, the first time you develop this condition, symptoms may last 3-4 weeks.  How is this diagnosed?  This condition may be diagnosed based on your symptoms and a physical exam. Your health care provider may also ask you about any recent outdoor activity.  How is this treated?  Treatment for this condition will vary depending on how severe it is. Treatment may include:  · Hydrocortisone cream or calamine lotion to relieve itching.  · Oatmeal baths to soothe the skin.  · Medicines, such as over-the-counter antihistamine tablets.  · Oral steroid medicine, for more severe reactions.  Follow these instructions at  home:  Medicines  · Take or apply over-the-counter and prescription medicines only as told by your health care provider.  · Use hydrocortisone cream or calamine lotion as needed to soothe the skin and relieve itching.  General instructions  · Do not scratch or rub your skin.  · Apply a cold, wet cloth (cold compress) to the affected areas or take baths in cool water. This will help with itching. Avoid hot baths and showers.  · Take oatmeal baths as needed. Use colloidal oatmeal. You can get this at your local pharmacy or grocery store. Follow the instructions on the packaging.  · While you have the rash, wash clothes right after you wear them.  · Keep all follow-up visits as told by your health care provider. This is important.  How is this prevented?    · Learn to identify the poison ivy plant and avoid contact with the plant. This plant can be recognized by the number of leaves. Generally, poison ivy has three leaves with flowering branches on a single stem. The leaves are typically glossy, and they have jagged edges that come to a point at the front.  · If you have been exposed to poison ivy, thoroughly wash with soap and water right away. You have about 30 minutes to remove the plant resin before it will cause the rash. Be sure to wash under your fingernails, because any plant resin there will continue to spread the rash.  · When hiking or camping, wear clothes that will help you to avoid exposure on the skin. This includes long pants, a long-sleeved shirt, tall socks, and hiking boots. You can also apply preventive lotion to your skin to help limit exposure.  · If you suspect that your clothes or outdoor gear came in contact with poison ivy, rinse them off outside with a garden hose before you bring them inside your house.  · When doing yard work or gardening, wear gloves, long sleeves, long pants, and boots. Wash your garden tools and gloves if they come in contact with poison ivy.  · If you suspect that your  pet has come into contact with poison ivy, wash him or her with pet shampoo and water. Make sure to wear gloves while washing your pet.  Contact a health care provider if you have:  · Open sores in the rash area.  · More redness, swelling, or pain in the affected area.  · Redness that spreads beyond the rash area.  · Fluid, blood, or pus coming from the affected area.  · A fever.  · A rash over a large area of your body.  · A rash on your eyes, mouth, or genitals.  · A rash that does not improve after a few weeks.  Get help right away if:  · Your face swells or your eyes swell shut.  · You have trouble breathing.  · You have trouble swallowing.  These symptoms may represent a serious problem that is an emergency. Do not wait to see if the symptoms will go away. Get medical help right away. Call your local emergency services (911 in the [place] Do not drive yourself to the hospital.  Summary  · Poison ivy dermatitis is inflammation of the skin that is caused by chemicals in the leaves of the poison ivy plant.  · Symptoms of this condition include redness, itching, a rash, and swelling.  · Do not scratch or rub your skin.  · Take or apply over-the-counter and prescription medicines only as told by your health care provider.  This information is not intended to replace advice given to you by your health care provider. Make sure you discuss any questions you have with your health care provider.  Document Released: 12/15/2001 Document Revised: 04/10/2020 Document Reviewed: 12/13/2019  Elsevier Patient Education © 2020 [employer] Inc[employer]

## 2023-02-14 NOTE — PROGRESS NOTES
Physical Therapy Daily Progress Note  Visit: 12    Ghazala Early reports: I am feeling better overall with my back pain and (L) LE pain.  I have been walking a lot more with my  and I stepped onto a curb wrong and strained my (L) calf.  It is still hurting some now.  I popped when I stepped onto it.      Subjective Evaluation    Pain  At worst pain rating: 3  Location: Lumbar midline to (L) Hip  Exacerbated by: Driving > 30 mins.           Objective          Active Range of Motion     Additional Active Range of Motion Details  Lumbar AROM (% of normal)  Flexion: 100%   Extension: 75%  Lateral (L): 100%  Lateral (R): 75%  Rotation (L): 100%   Rotation (R): 100%        Strength/Myotome Testing     Left Hip   Planes of Motion   Flexion: 4+  Abduction: 5  External rotation: 5  Internal rotation: 5    Right Hip   Planes of Motion   Flexion: 5  Abduction: 5  External rotation: 5  Internal rotation: 5    Left Knee   Flexion: 5  Extension: 5    Right Knee   Flexion: 5  Extension: 5    Left Ankle/Foot   Dorsiflexion: 5  Plantar flexion: 5  Eversion: 5  Great toe extension: 5    Right Ankle/Foot   Dorsiflexion: 5  Plantar flexion: 5  Eversion: 5  Great toe extension: 5      See Exercise, Manual, and Modality Logs for complete treatment.       Assessment & Plan     Assessment  Assessment details: The pt has progressed with her back pain with less pain, improved ROM, and improved (B) LE strength.  We will continue for 2 more visits to work on lumbosacral mobility and strength while the pt continues to walk on her own at home.    Plan  Plan details: Progress ROM / strengthening / stabilization / functional activity as tolerated          Manual Therapy:     12     mins  26605;  Therapeutic Exercise:     35     mins  10852;     Neuromuscular Ilene:         mins  10448;    Therapeutic Activity:      10     mins  62265;     Gait Training:            mins  08579;     Ultrasound:           mins  91326;    Electrical  Stimulation:          mins  07574 ( );  Dry Needling           mins self-pay  Traction           mins 69500  Canalith Repositioning         mins 90813      Timed Treatment:   57   mins   Total Treatment:     57   mins    Johny Kaur PT  [place] License #: [id number]    Physical Therapist       High Dose Vitamin A Pregnancy And Lactation Text: High dose vitamin A therapy is contraindicated during pregnancy and breast feeding.

## 2023-03-15 ENCOUNTER — OFFICE VISIT (OUTPATIENT)
Dept: GYNECOLOGIC ONCOLOGY | Facility: CLINIC | Age: 43
End: 2023-03-15
Payer: COMMERCIAL

## 2023-03-15 VITALS
TEMPERATURE: 98.9 F | HEART RATE: 87 BPM | OXYGEN SATURATION: 98 % | DIASTOLIC BLOOD PRESSURE: 84 MMHG | HEIGHT: 66 IN | RESPIRATION RATE: 12 BRPM | BODY MASS INDEX: 38.44 KG/M2 | WEIGHT: 239.2 LBS | SYSTOLIC BLOOD PRESSURE: 137 MMHG

## 2023-03-15 DIAGNOSIS — N93.9 ABNORMAL UTERINE BLEEDING (AUB): ICD-10-CM

## 2023-03-15 DIAGNOSIS — N94.6 DYSMENORRHEA: Primary | ICD-10-CM

## 2023-03-15 PROCEDURE — 99204 OFFICE O/P NEW MOD 45 MIN: CPT | Performed by: OBSTETRICS & GYNECOLOGY

## 2023-03-15 RX ORDER — HYDROCODONE BITARTRATE AND ACETAMINOPHEN 5; 325 MG/1; MG/1
1 TABLET ORAL EVERY 6 HOURS PRN
Qty: 20 TABLET | Refills: 0 | Status: SHIPPED | OUTPATIENT
Start: 2023-03-15

## 2023-03-15 NOTE — PROGRESS NOTES
Age: 43 y.o.  Sex: female  :  1980  MRN: [id number]       REFERRING PHYSICIAN: Zayda Walters MD       Ghazala Early presents to [facility] for evaluation and management of pelvic pain, ovarian cyst, cointuned bleeding and dysmenorrhea after ablation. She has no family hx breast or ovarian cancer.       Oncology/Hematology History Overview Note   Ghazala Early is a 44 y/o female, referred by Zayda Walters for Pelvic Pain and an ovarian cyst.    • 23: TVUS - Uterus 8 cm x 5.1 x 5 cm.  ET 4.8 mm. R ovary not visualized.  L ovary not visualized.  Uterus is anteverted and heterogenous in appearance.large 10.5 x 8.5 x 8.8 cm cyst in pelvis.      3/15/23: New Patient      Date            Value      Ref Range            Status  3/2/23             39.2        0.0 - 38.1 U/mL          Final           Past Medical History:  Past Medical History:   Diagnosis Date   • ADD (attention deficit disorder)    • Allergic    • Anemia    • Anxiety    • Eczema    • Endometrial mass    • Heavy menstrual bleeding    • History of cellulitis      INCISION   • Migraines    • PONV (postoperative nausea and vomiting)    • Seasonal allergies    • Sinus drainage    • Vertigo        Past Surgical History:  Past Surgical History:   Procedure Laterality Date   •  SECTION     • CHOLECYSTECTOMY     • D & C HYSTEROSCOPY ENDOMETRIAL ABLATION N/A 2018    Procedure: HYSTEROSCOPY DILATATION/CURETTAGE/NOVASURE, MYOSURE;  Surgeon: Zayda Walters MD;  Location: [facility];  Service: Obstetrics/Gynecology   • WISDOM TOOTH EXTRACTION          Current Meds:    Current Outpatient Medications:   •  fluticasone (FLONASE) 50 MCG/ACT nasal spray, 1 spray into the nostril(s) as directed by provider Daily., Disp: , Rfl:   •  Levocetirizine Dihydrochloride (XYZAL ALLERGY 24HR PO), Take  by mouth Daily., Disp: , Rfl:   •  meclizine (ANTIVERT) 12.5 MG tablet, Take 1 tablet by mouth 3 (Three) Times a Day  "As Needed for Dizziness., Disp: 45 tablet, Rfl: 1  •  melatonin 5 MG tablet tablet, Take 1 tablet by mouth At Night As Needed., Disp: , Rfl:   •  Cetirizine HCl (ZYRTEC ALLERGY PO), , Disp: , Rfl:       ALLERGIES:  Allergies   Allergen Reactions   • Sulfa Antibiotics Anaphylaxis   • Azithromycin Other (See Comments)     Lack of therapeutic value   • Ciprofloxacin Rash   • Penicillins Rash         ROS:  CONSTITUTIONAL:  Denies fever or chills.   NEUROLOGIC:  Denies headache, focal weakness or sensory changes.   EYES:  Denies change in visual acuity.  HEENT:  Denies nasal congestion or sore throat.   RESPIRATORY:  Denies cough or shortness of breath.   CARDIOVASCULAR:  Denies chest pain or edema.   GI:  Denies abdominal pain, nausea, vomiting, bloody stools or diarrhea.   :  Denies dysuria, leaking or incontinence.  MUSCULOSKELETAL:  Denies back pain or joint pain.   INTEGUMENT:  Denies rash.   ENDOCRINE:  Denies polyuria or polydipsia.   LYMPHATIC:  Denies swollen glands or lymphedema.   PSYCHIATRIC:  Denies depression or anxiety.      PHYSICAL EXAM:  Vitals:    03/15/23 1055   BP: 137/84   Pulse: 87   Resp: 12   Temp: 98.9 °F (37.2 °C)   TempSrc: Temporal   SpO2: 98%   Weight: 109 kg (239 lb 3.2 oz)   Height: 168.3 cm (66.25\")   PainSc:   1   PainLoc: Groin  Comment: mild cramping     Body mass index is 38.32 kg/m².  Current Status 3/15/2023   ECOG score 0       GEN: alert and oriented x 3, normal affect, well nourished and hydrated.  CARDIO: regular rate and rhythm.  PULM: Lungs CTA b.l, no RRW   ABD:Soft, nontender, nondistended.   GYN: External genitalia normal, no lesions. Speculum with normal vagina, normal appearingcervix without lesions. Bimanual exam does not reveal any palpable lesions or adnexal fullness  EXT: No petechiae, bruising, rash, candida. No CCE.           Result Review :  The pertinent labs, images, and/or pathology as noted in the oncology history were reviewed independently and discussed " with the patient.   Viri Babb, DO   03/15/2023    [facility]:   No new     [facility]:  TVUS noted         ASSESSMENT :  • Pelvic pain   • Dysmenorrhea  • Post ablation   • Suspected adenomyosis  • Ovarian cyst   • HLD           PLAN :  Perioperative mgmt: PAT,     The case was reviewed with the patient in detail, taking into consideration symptoms, laboratory results, imaging, pathology and physical exam findings.  At this point in time, I am recommending Exam under anesthesia, Robotic assisted total laparoscopic hysterectomy, bilateral salpingectomy, posisble unilateral oophorecotmy,  possible staging, possible laparotomy.     Risks, benefits, alternatives and indications to the procedure were reviewed in detail.    Risks of surgery include bleeding/hemorrhage which may require transfusion and associated transfusion risk (transfusion reaction, HCV, TRALI ); Infection, which may require antibiotic therapy or abscess drainage; Damage to surrounding internal organs (bowel, bladder, or urinary tract) which may result in obstruction or fistula; Nerve, or vascular injury which may result in numbness, pain, swelling or loss of strength. Risk of possible incomplete resolution of symptoms or failure to cure.  She understands that it is possible that intraoperative findings may warrant further treatment.  There is a low, but real, risk of unanticipated return to the OR for a problem associated with the surgery and a remote possibility of death.      • All questions were addressed and answered to the patient's satisfaction. She indicates understanding of these risks and desires to proceed. She wishes me to use my judgement to do the procedure that I feel is in her best interest. Surgical consents to be signed in Pre Op prior to surgery.   •             Viri Babb D.O  3/15/2023    Gynecologic Oncology   [address] [place] [address]  [phone] office

## 2023-03-21 ENCOUNTER — TELEPHONE (OUTPATIENT)
Dept: GYNECOLOGIC ONCOLOGY | Facility: CLINIC | Age: 43
End: 2023-03-21
Payer: COMMERCIAL

## 2023-03-21 NOTE — TELEPHONE ENCOUNTER
Caller: Sharan Early    Relationship: Self    Best call back number: [phone]    What is the best time to reach you: ANY    Who are you requesting to speak with (clinical staff, provider,  specific staff member): CLINICAL    What was the call regarding: SHARAN IS CALLING TO GET A SURGERY DATE.  SHE WAS IN LAST WEEK AND THE SURGERY SCHEDULER WAS OUT OF THE OFFICE    Do you require a callback: YES

## 2023-03-27 ENCOUNTER — PREP FOR SURGERY (OUTPATIENT)
Dept: OTHER | Facility: HOSPITAL | Age: 43
End: 2023-03-27
Payer: COMMERCIAL

## 2023-03-27 DIAGNOSIS — R10.2 PELVIC PAIN: Primary | ICD-10-CM

## 2023-03-30 ENCOUNTER — TELEPHONE (OUTPATIENT)
Dept: GYNECOLOGIC ONCOLOGY | Facility: CLINIC | Age: 43
End: 2023-03-30
Payer: COMMERCIAL

## 2023-03-30 ENCOUNTER — TELEPHONE (OUTPATIENT)
Dept: GYNECOLOGIC ONCOLOGY | Age: 43
End: 2023-03-30

## 2023-03-30 ENCOUNTER — PREP FOR SURGERY (OUTPATIENT)
Dept: OTHER | Facility: HOSPITAL | Age: 43
End: 2023-03-30
Payer: COMMERCIAL

## 2023-03-30 DIAGNOSIS — R10.2 PELVIC PAIN: Primary | ICD-10-CM

## 2023-03-30 RX ORDER — SODIUM CHLORIDE 0.9 % (FLUSH) 0.9 %
10 SYRINGE (ML) INJECTION EVERY 12 HOURS SCHEDULED
OUTPATIENT
Start: 2023-04-25

## 2023-03-30 RX ORDER — SODIUM CHLORIDE 9 MG/ML
40 INJECTION, SOLUTION INTRAVENOUS AS NEEDED
OUTPATIENT
Start: 2023-04-25

## 2023-03-30 RX ORDER — SODIUM CHLORIDE, SODIUM LACTATE, POTASSIUM CHLORIDE, CALCIUM CHLORIDE 600; 310; 30; 20 MG/100ML; MG/100ML; MG/100ML; MG/100ML
100 INJECTION, SOLUTION INTRAVENOUS CONTINUOUS
OUTPATIENT
Start: 2023-04-25

## 2023-03-30 RX ORDER — ONDANSETRON 2 MG/ML
4 INJECTION INTRAMUSCULAR; INTRAVENOUS EVERY 6 HOURS PRN
OUTPATIENT
Start: 2023-03-30

## 2023-03-30 RX ORDER — SODIUM CHLORIDE 0.9 % (FLUSH) 0.9 %
10 SYRINGE (ML) INJECTION AS NEEDED
OUTPATIENT
Start: 2023-04-25

## 2023-03-30 RX ORDER — INDOCYANINE GREEN AND WATER 25 MG
5 KIT INJECTION ONCE
OUTPATIENT
Start: 2023-04-25 | End: 2023-03-30

## 2023-03-30 RX ORDER — CEFAZOLIN SODIUM 2 G/100ML
2 INJECTION, SOLUTION INTRAVENOUS ONCE
OUTPATIENT
Start: 2023-04-25 | End: 2023-03-30

## 2023-03-30 NOTE — TELEPHONE ENCOUNTER
Spoke to patient and provided the following information, a copy has also been mailed to her. Patient verbalized understanding.     PAT DATE/TIME: 4/17 AT 8:30 AM  [facility]      SURGERY DATE: 4/25   ARRIVAL TIME: 5:30 AM  [facility], SECOND FLOOR      POST-OP DATE/TIME: 5/8 AT 11:15 AM

## 2023-03-30 NOTE — TELEPHONE ENCOUNTER
Caller: Sharan Early    Relationship: Self    Best call back number: [phone]    What is the best time to reach you: ANY    Who are you requesting to speak with (clinical staff, provider,  specific staff member): CLINICAL    What was the call regarding: SHARAN IS CALLING TO SPEAK TO SOMEONE REGARDING GETTING A SURGERY DATE SCHEDULED    Do you require a callback: YES

## 2023-04-17 ENCOUNTER — PATIENT ROUNDING (BHMG ONLY) (OUTPATIENT)
Dept: GYNECOLOGIC ONCOLOGY | Facility: CLINIC | Age: 43
End: 2023-04-17
Payer: COMMERCIAL

## 2023-04-17 ENCOUNTER — HOSPITAL ENCOUNTER (OUTPATIENT)
Dept: GENERAL RADIOLOGY | Facility: HOSPITAL | Age: 43
Discharge: HOME | End: 2023-04-17
Payer: COMMERCIAL

## 2023-04-17 ENCOUNTER — PRE-ADMISSION TESTING (OUTPATIENT)
Dept: PREADMISSION TESTING | Facility: HOSPITAL | Age: 43
End: 2023-04-17
Payer: COMMERCIAL

## 2023-04-17 VITALS
HEIGHT: 66 IN | TEMPERATURE: 98.1 F | BODY MASS INDEX: 39.25 KG/M2 | RESPIRATION RATE: 16 BRPM | OXYGEN SATURATION: 99 % | HEART RATE: 80 BPM | SYSTOLIC BLOOD PRESSURE: 137 MMHG | WEIGHT: 244.2 LBS | DIASTOLIC BLOOD PRESSURE: 84 MMHG

## 2023-04-17 DIAGNOSIS — R10.2 PELVIC PAIN: ICD-10-CM

## 2023-04-17 LAB
ABO GROUP BLD: NORMAL
ANION GAP SERPL CALCULATED.3IONS-SCNC: 9.4 MMOL/L (ref 5–15)
BLD GP AB SCN SERPL QL: NEGATIVE
BUN SERPL-MCNC: 10 MG/DL (ref 6–20)
BUN/CREAT SERPL: 16.4 (ref 7–25)
CALCIUM SPEC-SCNC: 9.3 MG/DL (ref 8.6–10.5)
CHLORIDE SERPL-SCNC: 107 MMOL/L (ref 98–107)
CO2 SERPL-SCNC: 25.6 MMOL/L (ref 22–29)
CREAT SERPL-MCNC: 0.61 MG/DL (ref 0.57–1)
DEPRECATED RDW RBC AUTO: 44.5 FL (ref 37–54)
EGFRCR SERPLBLD CKD-EPI 2021: 113.9 ML/MIN/1.73
ERYTHROCYTE [DISTWIDTH] IN BLOOD BY AUTOMATED COUNT: 14 % (ref 12.3–15.4)
GLUCOSE SERPL-MCNC: 84 MG/DL (ref 65–99)
HCT VFR BLD AUTO: 39.4 % (ref 34–46.6)
HGB BLD-MCNC: 14 G/DL (ref 12–15.9)
MCH RBC QN AUTO: 31.2 PG (ref 26.6–33)
MCHC RBC AUTO-ENTMCNC: 35.5 G/DL (ref 31.5–35.7)
MCV RBC AUTO: 87.8 FL (ref 79–97)
PLATELET # BLD AUTO: 197 10*3/MM3 (ref 140–450)
PMV BLD AUTO: 9.3 FL (ref 6–12)
POTASSIUM SERPL-SCNC: 3.5 MMOL/L (ref 3.5–5.2)
QT INTERVAL: 440 MS
RBC # BLD AUTO: 4.49 10*6/MM3 (ref 3.77–5.28)
RH BLD: POSITIVE
SODIUM SERPL-SCNC: 142 MMOL/L (ref 136–145)
T&S EXPIRATION DATE: NORMAL
WBC NRBC COR # BLD: 6.22 10*3/MM3 (ref 3.4–10.8)

## 2023-04-17 PROCEDURE — 86900 BLOOD TYPING SEROLOGIC ABO: CPT

## 2023-04-17 PROCEDURE — 36415 COLL VENOUS BLD VENIPUNCTURE: CPT

## 2023-04-17 PROCEDURE — 86850 RBC ANTIBODY SCREEN: CPT

## 2023-04-17 PROCEDURE — 71045 X-RAY EXAM CHEST 1 VIEW: CPT

## 2023-04-17 PROCEDURE — 86901 BLOOD TYPING SEROLOGIC RH(D): CPT

## 2023-04-17 PROCEDURE — 85027 COMPLETE CBC AUTOMATED: CPT

## 2023-04-17 PROCEDURE — 93005 ELECTROCARDIOGRAM TRACING: CPT

## 2023-04-17 PROCEDURE — 80048 BASIC METABOLIC PNL TOTAL CA: CPT

## 2023-04-17 RX ORDER — ACETAMINOPHEN 325 MG/1
650 TABLET ORAL EVERY 6 HOURS PRN
COMMUNITY

## 2023-04-17 RX ORDER — CHLORHEXIDINE GLUCONATE 500 MG/1
CLOTH TOPICAL
COMMUNITY

## 2023-04-17 RX ORDER — CETIRIZINE HYDROCHLORIDE 10 MG/1
10 TABLET ORAL DAILY PRN
COMMUNITY

## 2023-04-17 RX ORDER — DIPHENHYDRAMINE HCL 25 MG
25 CAPSULE ORAL EVERY 6 HOURS PRN
COMMUNITY

## 2023-04-17 NOTE — DISCHARGE INSTRUCTIONS
Take the following medications the morning of surgery: ZYRTEC AND FLONASE    ARRIVAL TIME IS 0530 ON 04/25/2023      If you are on prescription narcotic pain medication to control your pain you may also take that medication the morning of surgery.    General Instructions:  Do not eat solid food after midnight the night before surgery.  You may drink clear liquids day of surgery but must stop at least one hour before your hospital arrival time.  It is beneficial for you to have a clear drink that contains carbohydrates the day of surgery.  We suggest a 12 to 20 ounce bottle of Gatorade or Powerade for non-diabetic patients or a 12 to 20 ounce bottle of G2 or Powerade Zero for diabetic patients. (Pediatric patients, are not advised to drink a 12 to 20 ounce carbohydrate drink)    Clear liquids are liquids you can see through.  Nothing red in color.     Plain water                               Sports drinks  Sodas                                   Gelatin (Jell-O)  Fruit juices without pulp such as white grape juice and apple juice  Popsicles that contain no fruit or yogurt  Tea or coffee (no cream or milk added)  Gatorade / Powerade  G2 / Powerade Zero      Patients who avoid smoking, chewing tobacco and alcohol for 4 weeks prior to surgery have a reduced risk of post-operative complications.  Quit smoking as many days before surgery as you can.  Do not smoke, use chewing tobacco or drink alcohol the day of surgery.   If applicable bring your C-PAP/ BI-PAP machine in with you to preop day of surgery.  Bring any papers given to you in the doctor’s office.  Wear clean comfortable clothes.  Do not wear contact lenses, false eyelashes or make-up.  Bring a case for your glasses.   Bring crutches or walker if applicable.  Remove all piercings.  Leave jewelry and any other valuables at home.  Hair extensions with metal clips must be removed prior to surgery.  The Pre-Admission Testing nurse will instruct you to bring  medications if unable to obtain an accurate list in Pre-Admission Testing.        If you were given a blood bank ID arm band remember to bring it with you the day of surgery.    Preventing a Surgical Site Infection:  For 2 to 3 days before surgery, avoid shaving with a razor because the razor can irritate skin and make it easier to develop an infection.    Any areas of open skin can increase the risk of a post-operative wound infection by allowing bacteria to enter and travel throughout the body.  Notify your surgeon if you have any skin wounds / rashes even if it is not near the expected surgical site.  The area will need assessed to determine if surgery should be delayed until it is healed.  The night prior to surgery shower using a fresh bar of anti-bacterial soap (such as Dial) and clean washcloth.  Sleep in a clean bed with clean clothing.  Do not allow pets to sleep with you.  Shower on the morning of surgery using a fresh bar of anti-bacterial soap (such as Dial) and clean washcloth.  Dry with a clean towel and dress in clean clothing.  Ask your surgeon if you will be receiving antibiotics prior to surgery.  Make sure you, your family, and all healthcare providers clean their hands with soap and water or an alcohol based hand  before caring for you or your wound.      CHLORHEXIDINE CLOTH INSTRUCTIONS  The morning of surgery follow these instructions using the Chlorhexidine cloths you've been given.  These steps reduce bacteria on the body.  Do not use the cloths near your eyes, ears mouth, genitalia or on open wounds.  Throw the cloths away after use but do not try to flush them down a toilet.      Open and remove one cloth at a time from the package.    Leave the cloth unfolded and begin the bathing.  Massage the skin with the cloths using gentle pressure to remove bacteria.  Do not scrub harshly.   Follow the steps below with one 2% CHG cloth per area (6 total cloths).  One cloth for neck,  shoulders and chest.  One cloth for both arms, hands, fingers and underarms (do underarms last).  One cloth for the abdomen followed by groin.  One cloth for right leg and foot including between the toes.  One cloth for left leg and foot including between the toes.  The last cloth is to be used for the back of the neck, back and buttocks.    Allow the CHG to air dry 3 minutes on the skin which will give it time to work and decrease the chance of irritation.  The skin may feel sticky until it is dry.  Do not rinse with water or any other liquid or you will lose the beneficial effects of the CHG.  If mild skin irritation occurs, do rinse the skin to remove the CHG.  Report this to the nurse at time of admission.  Do not apply lotions, creams, ointments, deodorants or perfumes after using the clothes. Dress in clean clothes before coming to the hospital.     Day of surgery:  Your arrival time is approximately two hours before your scheduled surgery time.  Upon arrival, a Pre-op nurse and Anesthesiologist will review your health history, obtain vital signs, and answer questions you may have.  The only belongings needed at this time will be a list of your home medications and if applicable your C-PAP/BI-PAP machine.  A Pre-op nurse will start an IV and you may receive medication in preparation for surgery, including something to help you relax.     Please be aware that surgery does come with discomfort.  We want to make every effort to control your discomfort so please discuss any uncontrolled symptoms with your nurse.   Your doctor will most likely have prescribed pain medications.      If you are going home after surgery you will receive individualized written care instructions before being discharged.  A responsible adult must drive you to and from the hospital on the day of your surgery and stay with you for 24 hours.  Discharge prescriptions can be filled by the hospital pharmacy during regular pharmacy hours.  If  you are having surgery late in the day/evening your prescription may be e-prescribed to your pharmacy.  Please verify your pharmacy hours or chose a 24 hour pharmacy to avoid not having access to your prescription because your pharmacy has closed for the day.    If you are staying overnight following surgery, you will be transported to your hospital room following the recovery period.  [facility] has all private rooms.    If you have any questions please call Pre-Admission Testing at [phone].  Deductibles and co-payments are collected on the day of service. Please be prepared to pay the required co-pay, deductible or deposit on the day of service as defined by your plan.    Call your surgeon immediately if you experience any of the following symptoms:  Sore Throat  Shortness of Breath or difficulty breathing  Cough  Chills  Body soreness or muscle pain  Headache  Fever  New loss of taste or smell  Do not arrive for your surgery ill.  Your procedure will need to be rescheduled to another time.  You will need to call your physician before the day of surgery to avoid any unnecessary exposure to hospital staff as well as other patients.

## 2023-04-24 ENCOUNTER — ANESTHESIA EVENT (OUTPATIENT)
Dept: PERIOP | Facility: HOSPITAL | Age: 43
End: 2023-04-24
Payer: COMMERCIAL

## 2023-04-25 ENCOUNTER — ANESTHESIA (OUTPATIENT)
Dept: PERIOP | Facility: HOSPITAL | Age: 43
End: 2023-04-25
Payer: COMMERCIAL

## 2023-04-25 ENCOUNTER — HOSPITAL ENCOUNTER (OUTPATIENT)
Facility: HOSPITAL | Age: 43
Setting detail: HOSPITAL OUTPATIENT SURGERY
Discharge: HOME | End: 2023-04-25
Attending: OBSTETRICS & GYNECOLOGY | Admitting: OBSTETRICS & GYNECOLOGY
Payer: COMMERCIAL

## 2023-04-25 VITALS
TEMPERATURE: 98.4 F | DIASTOLIC BLOOD PRESSURE: 82 MMHG | BODY MASS INDEX: 38.89 KG/M2 | SYSTOLIC BLOOD PRESSURE: 130 MMHG | HEIGHT: 66 IN | WEIGHT: 242 LBS | HEART RATE: 79 BPM | RESPIRATION RATE: 16 BRPM | OXYGEN SATURATION: 97 %

## 2023-04-25 DIAGNOSIS — R10.2 PELVIC PAIN: ICD-10-CM

## 2023-04-25 DIAGNOSIS — Z98.890 POST-OPERATIVE STATE: Primary | ICD-10-CM

## 2023-04-25 LAB — HCG SERPL QL: NEGATIVE

## 2023-04-25 PROCEDURE — 84703 CHORIONIC GONADOTROPIN ASSAY: CPT | Performed by: OBSTETRICS & GYNECOLOGY

## 2023-04-25 PROCEDURE — 58571 TLH W/T/O 250 G OR LESS: CPT | Performed by: PHYSICIAN ASSISTANT

## 2023-04-25 PROCEDURE — 25010000002 FENTANYL CITRATE (PF) 50 MCG/ML SOLUTION: Performed by: STUDENT IN AN ORGANIZED HEALTH CARE EDUCATION/TRAINING PROGRAM

## 2023-04-25 PROCEDURE — 25010000002 SUGAMMADEX 200 MG/2ML SOLUTION: Performed by: STUDENT IN AN ORGANIZED HEALTH CARE EDUCATION/TRAINING PROGRAM

## 2023-04-25 PROCEDURE — S0260 H&P FOR SURGERY: HCPCS | Performed by: OBSTETRICS & GYNECOLOGY

## 2023-04-25 PROCEDURE — 25010000002 ONDANSETRON PER 1 MG: Performed by: STUDENT IN AN ORGANIZED HEALTH CARE EDUCATION/TRAINING PROGRAM

## 2023-04-25 PROCEDURE — 88331 PATH CONSLTJ SURG 1 BLK 1SPC: CPT | Performed by: OBSTETRICS & GYNECOLOGY

## 2023-04-25 PROCEDURE — 88307 TISSUE EXAM BY PATHOLOGIST: CPT | Performed by: OBSTETRICS & GYNECOLOGY

## 2023-04-25 PROCEDURE — 25010000002 MAGNESIUM SULFATE PER 500 MG OF MAGNESIUM: Performed by: STUDENT IN AN ORGANIZED HEALTH CARE EDUCATION/TRAINING PROGRAM

## 2023-04-25 PROCEDURE — S2900 ROBOTIC SURGICAL SYSTEM: HCPCS | Performed by: OBSTETRICS & GYNECOLOGY

## 2023-04-25 PROCEDURE — 25010000002 PROPOFOL 10 MG/ML EMULSION: Performed by: STUDENT IN AN ORGANIZED HEALTH CARE EDUCATION/TRAINING PROGRAM

## 2023-04-25 PROCEDURE — 58571 TLH W/T/O 250 G OR LESS: CPT | Performed by: OBSTETRICS & GYNECOLOGY

## 2023-04-25 PROCEDURE — 25010000002 MIDAZOLAM PER 1 MG: Performed by: ANESTHESIOLOGY

## 2023-04-25 PROCEDURE — 25010000002 CEFAZOLIN IN DEXTROSE 2-4 GM/100ML-% SOLUTION: Performed by: PHYSICIAN ASSISTANT

## 2023-04-25 PROCEDURE — 25010000002 DEXAMETHASONE SODIUM PHOSPHATE 20 MG/5ML SOLUTION: Performed by: STUDENT IN AN ORGANIZED HEALTH CARE EDUCATION/TRAINING PROGRAM

## 2023-04-25 DEVICE — ARISTA AH ABSORBABLE HEMOSTATIC PARTICLES
Type: IMPLANTABLE DEVICE | Site: ABDOMEN | Status: FUNCTIONAL
Brand: ARISTA™ AH

## 2023-04-25 RX ORDER — DEXAMETHASONE SODIUM PHOSPHATE 4 MG/ML
INJECTION, SOLUTION INTRA-ARTICULAR; INTRALESIONAL; INTRAMUSCULAR; INTRAVENOUS; SOFT TISSUE AS NEEDED
Status: DISCONTINUED | OUTPATIENT
Start: 2023-04-25 | End: 2023-04-25 | Stop reason: SURG

## 2023-04-25 RX ORDER — PROPOFOL 10 MG/ML
VIAL (ML) INTRAVENOUS AS NEEDED
Status: DISCONTINUED | OUTPATIENT
Start: 2023-04-25 | End: 2023-04-25 | Stop reason: SURG

## 2023-04-25 RX ORDER — INDOCYANINE GREEN AND WATER 25 MG
5 KIT INJECTION ONCE
Status: DISCONTINUED | OUTPATIENT
Start: 2023-04-25 | End: 2023-04-25 | Stop reason: HOSPADM

## 2023-04-25 RX ORDER — SODIUM CHLORIDE 0.9 % (FLUSH) 0.9 %
3-10 SYRINGE (ML) INJECTION AS NEEDED
Status: DISCONTINUED | OUTPATIENT
Start: 2023-04-25 | End: 2023-04-25 | Stop reason: HOSPADM

## 2023-04-25 RX ORDER — HYDROCODONE BITARTRATE AND ACETAMINOPHEN 7.5; 325 MG/1; MG/1
1 TABLET ORAL ONCE AS NEEDED
Status: COMPLETED | OUTPATIENT
Start: 2023-04-25 | End: 2023-04-25

## 2023-04-25 RX ORDER — HYDROMORPHONE HYDROCHLORIDE 1 MG/ML
0.5 INJECTION, SOLUTION INTRAMUSCULAR; INTRAVENOUS; SUBCUTANEOUS
Status: DISCONTINUED | OUTPATIENT
Start: 2023-04-25 | End: 2023-04-25 | Stop reason: HOSPADM

## 2023-04-25 RX ORDER — SCOLOPAMINE TRANSDERMAL SYSTEM 1 MG/1
1 PATCH, EXTENDED RELEASE TRANSDERMAL
Status: DISCONTINUED | OUTPATIENT
Start: 2023-04-25 | End: 2023-04-25 | Stop reason: HOSPADM

## 2023-04-25 RX ORDER — ONDANSETRON 2 MG/ML
INJECTION INTRAMUSCULAR; INTRAVENOUS AS NEEDED
Status: DISCONTINUED | OUTPATIENT
Start: 2023-04-25 | End: 2023-04-25 | Stop reason: SURG

## 2023-04-25 RX ORDER — LIDOCAINE HYDROCHLORIDE 20 MG/ML
INJECTION, SOLUTION INFILTRATION; PERINEURAL AS NEEDED
Status: DISCONTINUED | OUTPATIENT
Start: 2023-04-25 | End: 2023-04-25 | Stop reason: SURG

## 2023-04-25 RX ORDER — FAMOTIDINE 10 MG/ML
20 INJECTION, SOLUTION INTRAVENOUS ONCE
Status: COMPLETED | OUTPATIENT
Start: 2023-04-25 | End: 2023-04-25

## 2023-04-25 RX ORDER — SODIUM CHLORIDE 0.9 % (FLUSH) 0.9 %
10 SYRINGE (ML) INJECTION EVERY 12 HOURS SCHEDULED
Status: DISCONTINUED | OUTPATIENT
Start: 2023-04-25 | End: 2023-04-25 | Stop reason: HOSPADM

## 2023-04-25 RX ORDER — FENTANYL CITRATE 50 UG/ML
INJECTION, SOLUTION INTRAMUSCULAR; INTRAVENOUS AS NEEDED
Status: DISCONTINUED | OUTPATIENT
Start: 2023-04-25 | End: 2023-04-25 | Stop reason: SURG

## 2023-04-25 RX ORDER — PROMETHAZINE HYDROCHLORIDE 25 MG/1
25 TABLET ORAL ONCE AS NEEDED
Status: DISCONTINUED | OUTPATIENT
Start: 2023-04-25 | End: 2023-04-25 | Stop reason: HOSPADM

## 2023-04-25 RX ORDER — SODIUM CHLORIDE 0.9 % (FLUSH) 0.9 %
10 SYRINGE (ML) INJECTION AS NEEDED
Status: DISCONTINUED | OUTPATIENT
Start: 2023-04-25 | End: 2023-04-25 | Stop reason: HOSPADM

## 2023-04-25 RX ORDER — MAGNESIUM SULFATE HEPTAHYDRATE 500 MG/ML
INJECTION, SOLUTION INTRAMUSCULAR; INTRAVENOUS AS NEEDED
Status: DISCONTINUED | OUTPATIENT
Start: 2023-04-25 | End: 2023-04-25 | Stop reason: SURG

## 2023-04-25 RX ORDER — CEFAZOLIN SODIUM 2 G/100ML
2 INJECTION, SOLUTION INTRAVENOUS ONCE
Status: COMPLETED | OUTPATIENT
Start: 2023-04-25 | End: 2023-04-25

## 2023-04-25 RX ORDER — FLUMAZENIL 0.1 MG/ML
0.2 INJECTION INTRAVENOUS AS NEEDED
Status: DISCONTINUED | OUTPATIENT
Start: 2023-04-25 | End: 2023-04-25 | Stop reason: HOSPADM

## 2023-04-25 RX ORDER — SODIUM CHLORIDE 9 MG/ML
INJECTION, SOLUTION INTRAVENOUS AS NEEDED
Status: DISCONTINUED | OUTPATIENT
Start: 2023-04-25 | End: 2023-04-25 | Stop reason: HOSPADM

## 2023-04-25 RX ORDER — BUPIVACAINE HYDROCHLORIDE AND EPINEPHRINE 5; 5 MG/ML; UG/ML
INJECTION, SOLUTION EPIDURAL; INTRACAUDAL; PERINEURAL AS NEEDED
Status: DISCONTINUED | OUTPATIENT
Start: 2023-04-25 | End: 2023-04-25 | Stop reason: HOSPADM

## 2023-04-25 RX ORDER — HYDROCODONE BITARTRATE AND ACETAMINOPHEN 5; 325 MG/1; MG/1
1 TABLET ORAL EVERY 6 HOURS PRN
Qty: 20 TABLET | Refills: 0 | Status: SHIPPED | OUTPATIENT
Start: 2023-04-25

## 2023-04-25 RX ORDER — ROCURONIUM BROMIDE 10 MG/ML
INJECTION, SOLUTION INTRAVENOUS AS NEEDED
Status: DISCONTINUED | OUTPATIENT
Start: 2023-04-25 | End: 2023-04-25 | Stop reason: SURG

## 2023-04-25 RX ORDER — PROMETHAZINE HYDROCHLORIDE 25 MG/1
25 SUPPOSITORY RECTAL ONCE AS NEEDED
Status: DISCONTINUED | OUTPATIENT
Start: 2023-04-25 | End: 2023-04-25 | Stop reason: HOSPADM

## 2023-04-25 RX ORDER — MAGNESIUM HYDROXIDE 1200 MG/15ML
LIQUID ORAL AS NEEDED
Status: DISCONTINUED | OUTPATIENT
Start: 2023-04-25 | End: 2023-04-25 | Stop reason: HOSPADM

## 2023-04-25 RX ORDER — MIDAZOLAM HYDROCHLORIDE 1 MG/ML
1 INJECTION INTRAMUSCULAR; INTRAVENOUS
Status: COMPLETED | OUTPATIENT
Start: 2023-04-25 | End: 2023-04-25

## 2023-04-25 RX ORDER — DIPHENHYDRAMINE HYDROCHLORIDE 50 MG/ML
12.5 INJECTION INTRAMUSCULAR; INTRAVENOUS
Status: DISCONTINUED | OUTPATIENT
Start: 2023-04-25 | End: 2023-04-25 | Stop reason: HOSPADM

## 2023-04-25 RX ORDER — EPHEDRINE SULFATE 50 MG/ML
5 INJECTION, SOLUTION INTRAVENOUS ONCE AS NEEDED
Status: DISCONTINUED | OUTPATIENT
Start: 2023-04-25 | End: 2023-04-25 | Stop reason: HOSPADM

## 2023-04-25 RX ORDER — DROPERIDOL 2.5 MG/ML
0.62 INJECTION, SOLUTION INTRAMUSCULAR; INTRAVENOUS
Status: DISCONTINUED | OUTPATIENT
Start: 2023-04-25 | End: 2023-04-25 | Stop reason: HOSPADM

## 2023-04-25 RX ORDER — SODIUM CHLORIDE, SODIUM LACTATE, POTASSIUM CHLORIDE, CALCIUM CHLORIDE 600; 310; 30; 20 MG/100ML; MG/100ML; MG/100ML; MG/100ML
9 INJECTION, SOLUTION INTRAVENOUS CONTINUOUS
Status: DISCONTINUED | OUTPATIENT
Start: 2023-04-25 | End: 2023-04-25 | Stop reason: HOSPADM

## 2023-04-25 RX ORDER — FENTANYL CITRATE 50 UG/ML
50 INJECTION, SOLUTION INTRAMUSCULAR; INTRAVENOUS
Status: DISCONTINUED | OUTPATIENT
Start: 2023-04-25 | End: 2023-04-25 | Stop reason: HOSPADM

## 2023-04-25 RX ORDER — ONDANSETRON 2 MG/ML
4 INJECTION INTRAMUSCULAR; INTRAVENOUS ONCE AS NEEDED
Status: COMPLETED | OUTPATIENT
Start: 2023-04-25 | End: 2023-04-25

## 2023-04-25 RX ORDER — SODIUM CHLORIDE, SODIUM LACTATE, POTASSIUM CHLORIDE, CALCIUM CHLORIDE 600; 310; 30; 20 MG/100ML; MG/100ML; MG/100ML; MG/100ML
100 INJECTION, SOLUTION INTRAVENOUS CONTINUOUS
Status: DISCONTINUED | OUTPATIENT
Start: 2023-04-25 | End: 2023-04-25 | Stop reason: HOSPADM

## 2023-04-25 RX ORDER — IPRATROPIUM BROMIDE AND ALBUTEROL SULFATE 2.5; .5 MG/3ML; MG/3ML
3 SOLUTION RESPIRATORY (INHALATION) ONCE AS NEEDED
Status: DISCONTINUED | OUTPATIENT
Start: 2023-04-25 | End: 2023-04-25 | Stop reason: HOSPADM

## 2023-04-25 RX ORDER — NALOXONE HCL 0.4 MG/ML
0.2 VIAL (ML) INJECTION AS NEEDED
Status: DISCONTINUED | OUTPATIENT
Start: 2023-04-25 | End: 2023-04-25 | Stop reason: HOSPADM

## 2023-04-25 RX ORDER — SODIUM CHLORIDE 0.9 % (FLUSH) 0.9 %
3 SYRINGE (ML) INJECTION EVERY 12 HOURS SCHEDULED
Status: DISCONTINUED | OUTPATIENT
Start: 2023-04-25 | End: 2023-04-25 | Stop reason: HOSPADM

## 2023-04-25 RX ORDER — SODIUM CHLORIDE 9 MG/ML
40 INJECTION, SOLUTION INTRAVENOUS AS NEEDED
Status: DISCONTINUED | OUTPATIENT
Start: 2023-04-25 | End: 2023-04-25 | Stop reason: HOSPADM

## 2023-04-25 RX ORDER — HYDROCODONE BITARTRATE AND ACETAMINOPHEN 5; 325 MG/1; MG/1
1 TABLET ORAL EVERY 6 HOURS PRN
Status: DISCONTINUED | OUTPATIENT
Start: 2023-04-25 | End: 2023-04-25 | Stop reason: HOSPADM

## 2023-04-25 RX ORDER — LIDOCAINE HYDROCHLORIDE 10 MG/ML
0.5 INJECTION, SOLUTION EPIDURAL; INFILTRATION; INTRACAUDAL; PERINEURAL ONCE AS NEEDED
Status: DISCONTINUED | OUTPATIENT
Start: 2023-04-25 | End: 2023-04-25 | Stop reason: HOSPADM

## 2023-04-25 RX ORDER — HYDRALAZINE HYDROCHLORIDE 20 MG/ML
5 INJECTION INTRAMUSCULAR; INTRAVENOUS
Status: DISCONTINUED | OUTPATIENT
Start: 2023-04-25 | End: 2023-04-25 | Stop reason: HOSPADM

## 2023-04-25 RX ORDER — OXYCODONE AND ACETAMINOPHEN 7.5; 325 MG/1; MG/1
1 TABLET ORAL EVERY 4 HOURS PRN
Status: DISCONTINUED | OUTPATIENT
Start: 2023-04-25 | End: 2023-04-25 | Stop reason: HOSPADM

## 2023-04-25 RX ORDER — LABETALOL HYDROCHLORIDE 5 MG/ML
5 INJECTION, SOLUTION INTRAVENOUS
Status: DISCONTINUED | OUTPATIENT
Start: 2023-04-25 | End: 2023-04-25 | Stop reason: HOSPADM

## 2023-04-25 RX ADMIN — MAGNESIUM SULFATE HEPTAHYDRATE 1 G: 500 INJECTION, SOLUTION INTRAMUSCULAR; INTRAVENOUS at 08:38

## 2023-04-25 RX ADMIN — LIDOCAINE HYDROCHLORIDE 100 MG: 20 INJECTION, SOLUTION INFILTRATION; PERINEURAL at 07:42

## 2023-04-25 RX ADMIN — MIDAZOLAM 1 MG: 1 INJECTION INTRAMUSCULAR; INTRAVENOUS at 07:27

## 2023-04-25 RX ADMIN — ONDANSETRON 4 MG: 2 INJECTION INTRAMUSCULAR; INTRAVENOUS at 10:14

## 2023-04-25 RX ADMIN — ROCURONIUM BROMIDE 20 MG: 10 INJECTION, SOLUTION INTRAVENOUS at 08:30

## 2023-04-25 RX ADMIN — FENTANYL CITRATE 50 MCG: 50 INJECTION, SOLUTION INTRAMUSCULAR; INTRAVENOUS at 10:10

## 2023-04-25 RX ADMIN — FENTANYL CITRATE 25 MCG: 50 INJECTION, SOLUTION INTRAMUSCULAR; INTRAVENOUS at 09:40

## 2023-04-25 RX ADMIN — SCOPALAMINE 1 PATCH: 1 PATCH, EXTENDED RELEASE TRANSDERMAL at 07:17

## 2023-04-25 RX ADMIN — FENTANYL CITRATE 25 MCG: 50 INJECTION, SOLUTION INTRAMUSCULAR; INTRAVENOUS at 09:03

## 2023-04-25 RX ADMIN — MIDAZOLAM 1 MG: 1 INJECTION INTRAMUSCULAR; INTRAVENOUS at 07:15

## 2023-04-25 RX ADMIN — FENTANYL CITRATE 25 MCG: 50 INJECTION, SOLUTION INTRAMUSCULAR; INTRAVENOUS at 08:17

## 2023-04-25 RX ADMIN — ROCURONIUM BROMIDE 50 MG: 10 INJECTION, SOLUTION INTRAVENOUS at 07:43

## 2023-04-25 RX ADMIN — SODIUM CHLORIDE, POTASSIUM CHLORIDE, SODIUM LACTATE AND CALCIUM CHLORIDE 9 ML/HR: 600; 310; 30; 20 INJECTION, SOLUTION INTRAVENOUS at 07:16

## 2023-04-25 RX ADMIN — CEFAZOLIN SODIUM 2 G: 2 INJECTION, SOLUTION INTRAVENOUS at 07:28

## 2023-04-25 RX ADMIN — PROPOFOL 25 MCG/KG/MIN: 10 INJECTION, EMULSION INTRAVENOUS at 08:02

## 2023-04-25 RX ADMIN — Medication 20 MG: at 07:15

## 2023-04-25 RX ADMIN — FENTANYL CITRATE 50 MCG: 50 INJECTION, SOLUTION INTRAMUSCULAR; INTRAVENOUS at 10:25

## 2023-04-25 RX ADMIN — SUGAMMADEX 200 MG: 100 INJECTION, SOLUTION INTRAVENOUS at 09:36

## 2023-04-25 RX ADMIN — HYDROCODONE BITARTRATE AND ACETAMINOPHEN 1 TABLET: 7.5; 325 TABLET ORAL at 10:36

## 2023-04-25 RX ADMIN — ONDANSETRON 4 MG: 2 INJECTION INTRAMUSCULAR; INTRAVENOUS at 09:36

## 2023-04-25 RX ADMIN — SODIUM CHLORIDE, POTASSIUM CHLORIDE, SODIUM LACTATE AND CALCIUM CHLORIDE: 600; 310; 30; 20 INJECTION, SOLUTION INTRAVENOUS at 09:03

## 2023-04-25 RX ADMIN — FENTANYL CITRATE 25 MCG: 50 INJECTION, SOLUTION INTRAMUSCULAR; INTRAVENOUS at 07:49

## 2023-04-25 RX ADMIN — PROPOFOL 200 MG: 10 INJECTION, EMULSION INTRAVENOUS at 07:42

## 2023-04-25 RX ADMIN — DEXAMETHASONE SODIUM PHOSPHATE 8 MG: 4 INJECTION, SOLUTION INTRAMUSCULAR; INTRAVENOUS at 07:49

## 2023-04-25 NOTE — H&P
Pt seen and examined in pre op holding area. There are no changes to her original H & P.   We reviewed the procedure as planned, as well as the benefits, risks, alternatives and possible complications (bleeding, infection, hemorrhage, damage to surrournding structures including bladder or bowel, failure to cure, need for additional treatment, as well as perioperative cardiopulmonary, thromboembolic or other medical events.)   We reviewed post operative care and home instructions.  All questions were answered and she wishes to proceed with surgery.           Viri Babb D.O  2023  07:38 EDT    Gynecologic Oncology   [address] [facility] [address]  [place] [address]    [phone] office            Age: 43 y.o.  Sex: female  :  1980  MRN: [id number]       REFERRING PHYSICIAN: Viri Babb DO       Ghazala Early presents to [facility] for evaluation and management of pelvic pain, ovarian cyst, cointuned bleeding and dysmenorrhea after ablation. She has no family hx breast or ovarian cancer.       Oncology/Hematology History Overview Note   Ghazala Early is a 44 y/o female, referred by Zayda Walters for Pelvic Pain and an ovarian cyst.    • 23: TVUS - Uterus 8 cm x 5.1 x 5 cm.  ET 4.8 mm. R ovary not visualized.  L ovary not visualized.  Uterus is anteverted and heterogenous in appearance.large 10.5 x 8.5 x 8.8 cm cyst in pelvis.      3/15/23: New Patient      Date            Value      Ref Range            Status  3/2/23             39.2        0.0 - 38.1 U/mL          Final           Past Medical History:  Past Medical History:   Diagnosis Date   • ADD (attention deficit disorder)    • Allergic    • Anemia    • Anxiety    • Eczema    • Endometrial mass    • Heavy menstrual bleeding    • History of cellulitis 2002     INCISION   • History of COVID-19 2020   • Migraines    • PONV (postoperative nausea and vomiting)    • Seasonal allergies    • Sinus drainage     • Vertigo        Past Surgical History:  Past Surgical History:   Procedure Laterality Date   •  SECTION  2002   • CHOLECYSTECTOMY     • D & C HYSTEROSCOPY ENDOMETRIAL ABLATION N/A 2018    Procedure: HYSTEROSCOPY DILATATION/CURETTAGE/NOVASURE, MYOSURE;  Surgeon: Zayda Walters MD;  Location: [facility] OR [facility];  Service: Obstetrics/Gynecology   • WISDOM TOOTH EXTRACTION          Current Meds:    Current Facility-Administered Medications:   •  ceFAZolin in dextrose (ANCEF) IVPB solution 2 g, 2 g, Intravenous, Once, Kareen Rodriguez PA-C, 2 g at 23 0728  •  fentaNYL citrate (PF) (SUBLIMAZE) injection 50 mcg, 50 mcg, Intravenous, Q10 Min PRN, Savannah Shah MD  •  indocyanine green (IC-GREEN) injection 5 mg, 5 mg, Injection, Once, Kareen Rodriguez PA-C  •  lactated ringers infusion, 9 mL/hr, Intravenous, Continuous, Savannah Shah MD, Last Rate: 9 mL/hr at 23 0716, 9 mL/hr at 23 0716  •  lactated ringers infusion, 100 mL/hr, Intravenous, Continuous, Kareen Rodriguez PA-C  •  lidocaine PF 1% (XYLOCAINE) injection 0.5 mL, 0.5 mL, Injection, Once PRN, Savannah Shah MD  •  scopolamine patch 1 mg/72 hr, 1 patch, Transdermal, Q72H, Savannah Shah MD, 1 patch at 23 0717  •  sodium chloride 0.9 % flush 10 mL, 10 mL, Intravenous, Q12H, Kareen Rodriguez PA-C  •  sodium chloride 0.9 % flush 10 mL, 10 mL, Intravenous, PRN, Kareen Rodriguez PA-C  •  sodium chloride 0.9 % flush 3 mL, 3 mL, Intravenous, Q12H, Savannah Shah MD  •  sodium chloride 0.9 % flush 3-10 mL, 3-10 mL, Intravenous, PRN, Savannah Shah MD  •  sodium chloride 0.9 % infusion 40 mL, 40 mL, Intravenous, PRN, Kareen Rodriguez PA-C      ALLERGIES:  Allergies   Allergen Reactions   • Sulfa Antibiotics Anaphylaxis   • Azithromycin Other (See Comments)     Lack of therapeutic value   • Ciprofloxacin Rash   • Penicillins Rash         ROS:  CONSTITUTIONAL:  Denies fever or chills.   NEUROLOGIC:  Denies headache, focal weakness or sensory changes.  "  EYES:  Denies change in visual acuity.  HEENT:  Denies nasal congestion or sore throat.   RESPIRATORY:  Denies cough or shortness of breath.   CARDIOVASCULAR:  Denies chest pain or edema.   GI:  Denies abdominal pain, nausea, vomiting, bloody stools or diarrhea.   :  Denies dysuria, leaking or incontinence.  MUSCULOSKELETAL:  Denies back pain or joint pain.   INTEGUMENT:  Denies rash.   ENDOCRINE:  Denies polyuria or polydipsia.   LYMPHATIC:  Denies swollen glands or lymphedema.   PSYCHIATRIC:  Denies depression or anxiety.      PHYSICAL EXAM:  Vitals:    04/25/23 0633 04/25/23 0637   BP:  146/83   Pulse: 89    Resp: 18    Temp: 98.3 °F (36.8 °C)    TempSrc: Oral    SpO2: 99%    Weight: 110 kg (242 lb)    Height: 167.6 cm (66\")      Body mass index is 39.06 kg/m².      3/15/2023    10:52 AM   Current Status   ECOG score 0       GEN: alert and oriented x 3, normal affect, well nourished and hydrated.  CARDIO: regular rate and rhythm.  PULM: Lungs CTA b.l, no RRW   ABD:Soft, nontender, nondistended.   GYN: External genitalia normal, no lesions. Speculum with normal vagina, normal appearingcervix without lesions. Bimanual exam does not reveal any palpable lesions or adnexal fullness  EXT: No petechiae, bruising, rash, candida. No CCE.           Result Review :  The pertinent labs, images, and/or pathology as noted in the oncology history were reviewed independently and discussed with the patient.   No name on file   03/15/2023    [facility] [place]:   No new     [facility]:  TVUS noted       ASSESSMENT :  • Pelvic pain   • Dysmenorrhea  • Post ablation   • Suspected adenomyosis  • Ovarian cyst   • HLD       PLAN :  Perioperative mgmt: PAT,     The case was reviewed with the patient in detail, taking into consideration symptoms, laboratory results, imaging, pathology and physical exam findings.  At this point in time, I am recommending Exam under anesthesia, Robotic assisted total laparoscopic hysterectomy, bilateral " salpingectomy, posisble unilateral oophorecotmy,  possible staging, possible laparotomy.     Risks, benefits, alternatives and indications to the procedure were reviewed in detail.    Risks of surgery include bleeding/hemorrhage which may require transfusion and associated transfusion risk (transfusion reaction, HCV, TRALI ); Infection, which may require antibiotic therapy or abscess drainage; Damage to surrounding internal organs (bowel, bladder, or urinary tract) which may result in obstruction or fistula; Nerve, or vascular injury which may result in numbness, pain, swelling or loss of strength. Risk of possible incomplete resolution of symptoms or failure to cure.  She understands that it is possible that intraoperative findings may warrant further treatment.  There is a low, but real, risk of unanticipated return to the OR for a problem associated with the surgery and a remote possibility of death.      • All questions were addressed and answered to the patient's satisfaction. She indicates understanding of these risks and desires to proceed. She wishes me to use my judgement to do the procedure that I feel is in her best interest. Surgical consents to be signed in Pre Op prior to surgery.   •             Viri Babb D.O  4/25/2023    Gynecologic Oncology   [address] [place] [address]  [phone] office

## 2023-04-25 NOTE — ANESTHESIA POSTPROCEDURE EVALUATION
Patient: Ghazala Early    Procedure Summary     Date: 04/25/23 Room / Location: [facility] OR  / [facility] MAIN OR    Anesthesia Start: 0736 Anesthesia Stop: 0954    Procedure: Exam under anesthesia, Robotic assisted total laparoscopic hysterectomy, bilateral salpingectomy, left  oophorecotmy, (Abdomen) Diagnosis:       Pelvic pain      (Pelvic pain [R10.2])    Surgeons: Viri Babb DO Provider: Savannah Shah MD    Anesthesia Type: general ASA Status: 2          Anesthesia Type: general    Vitals  Vitals Value Taken Time   /78 04/25/23 1101   Temp 36.9 °C (98.4 °F) 04/25/23 0950   Pulse 85 04/25/23 1040   Resp 16 04/25/23 0950   SpO2 97 % 04/25/23 1040   Vitals shown include unvalidated device data.        Post Anesthesia Care and Evaluation    Patient location during evaluation: PACU  Patient participation: complete - patient participated  Level of consciousness: awake and alert  Pain management: adequate    Airway patency: patent  Anesthetic complications: No anesthetic complications    Cardiovascular status: acceptable  Respiratory status: acceptable  Hydration status: acceptable    Comments: --------------------            04/25/23               1145     --------------------   BP:       130/82     Pulse:      79       Resp:                Temp:                SpO2:      97%      --------------------

## 2023-04-25 NOTE — OP NOTE
Gynecologic Oncology - Operative Report         PATIENT NAME: Ghazala Early  YOB: 1980   AGE: 43 y.o.  MRN#: [id number]  [facility]#: [id number]      DATE OF OPERATION: 4/25/2023    PREOPERATIVE DIAGNOSIS:   1. Large adnexal mass   2. Abnormal uterine bleeding refractory to ablation     POSTOPERATIVE DIAGNOSIS:  1. Same     OPERATION PERFORMED:    1. Robotic assisted total laparoscopic hysterectomy   2. Bilateral salpingectomy   3. Left oophorectomy   4. Lysis of adhesions     SURGEON: Viri Babb D.O     ASSISTANT: Kareen SMITH / Mona Bennett PAC  - who assisted with all aspects of the case including positioning, set up, retraction, suction, suturing and closure.     ANESTHESIA: GEN    ESTIMATED BLOOD LOSS: 150mL   IVF: 1300mL LR   UO: 300mL   LINES/DRAINS: n/a    FINDINGS: large left adnexal mass, adherent to colon, enlarged uterus, normal bilateral tubes and right ovary     PROCEDURE: After assuring informed consent the patient was taken back to the operating suite and induction of general anesthesia was performed.  The patient was placed in the modified dorsal lithotomy position in Severiano stirrups then prepped and draped in the normal sterile fashion.  The open sided bi-valve speculum was placed in the patient's vagina and the cervix was grasped at the 12 O'clock position with a single tooth tenaculum.  The uterus was sounded to 12 cm and the cervix was dilated with the kirill dilators up to a number 15.  A V-Care uterine manipulator was placed inside the uterine cavity.  Hawthorne catheter was placed.  Gloves were changed and attention was then taken to the abdomen.    A supraumbilical skin incision was then made approximately 26cm above the pubic symphysis and a veress needle was introduced into this incision.  Proper placement was confirmed by sterile water flowing easily into this incision and a low opening CO2 pressure.  High flow insufflation was utilized to create pneumoperitoneum to a maximum  pressure of 15 mmHg.  An 8mm trocar was then inserted through this incision and the Robotic camera verified proper placement and safe entry for camera port/robotic arm #3.  Three additional 8mm ports were placed.  Robotic arm #4 port was placed 10cm right lateral and 15 degrees inferiorly.  Robotic arm #2 port was placed 10 cm left lateral and 15 degrees inferiorly. Arm 1 was placed 10cm left lateral and 15 degrees superior to arm #2.  A 10/12 accessory port was placed beneath the right costal margin.  The patient was then placed in steep Trendelenberg position.  Pelvic washings were obtained.  Blunt grasper was used to move bowel and omentum into the upper abdomen for maximum visualization. The robot was docked.  Monopolar scissors were placed in arm #4; bipolar fenestrated graspers in arm #2 and the double fenestrated graspers were placed in arm #1.      Bilateral round ligaments were taken down with monopolar cautery and the retroperitoneal space was opened.  The left underlying ureter was visualized. A window was created in the posterior leaf of the broad ligament.  The left infundibulopelvic ligament and the right uteroovarian ligament was cauterized with bipolar cautery and cut with monopolar cautery.  The vesicouterine peritoneum was incised and the bladder was dissected off the lower uterine segment and cervix.  The uterine vessels were then cauterized bilaterally with the bipolar and transected with the monopolar cautery.  The cardinal and uterosacral ligaments were then cauterized with the bipolar and transected with the monopolar cautery.  A posterior colpotomy was made at the level of the Vcare and brought around circumferentially freeing up the specimen, which was brought out through the vagina.    The vaginal cuff was then reapproximated with 0 PDS suture in a running fashion, starting from either end and tying in the middle.  The pelvis was then irrigated and the vaginal cuff was noted to be  hemostatic.  The left infundibulopelvic ligament was noted to be hemostatic and the right uteroovarian ligament was hemostatic.  The instruments were then removed and the robot was undocked.  The pneumoperitoneum was then evacuated and all trocars were removed.  The 10-12 mm trocar site fascia was reapproximated with a figure of eight stitch of 0 vicryl.  All skin incisions were closed with a 4-0 vicryl subcuticular stitch.  Incisions were reinjected with additional local anesthetic. Steri-strips were placed as well as bandages.  The vagina was examined with sponge sticks x 2 and noted to be hemostatic.  Sponge, lap, needle and instrument counts were correct x 2.  The patient was taken to the recovery room in awake and stable condition.      Viri Babb D.O  4/25/2023    Gynecologic Oncology   [address] [address] [phone] office

## 2023-04-25 NOTE — ANESTHESIA PROCEDURE NOTES
Airway  Urgency: elective    Date/Time: 4/25/2023 7:46 AM  Airway not difficult    General Information and Staff    Patient location during procedure: OR  Anesthesiologist: Savannah Shah MD  CRNA/CAA: Jonnathan Herrera CRNA    Indications and Patient Condition  Indications for airway management: airway protection    Preoxygenated: yes  MILS not maintained throughout  Mask difficulty assessment: 1 - vent by mask    Final Airway Details  Final airway type: endotracheal airway      Successful airway: ETT  Cuffed: yes   Successful intubation technique: direct laryngoscopy  Endotracheal tube insertion site: oral  Blade: Amberly  Blade size: 3  ETT size (mm): 7.5  Cormack-Lehane Classification: grade IIa - partial view of glottis  Placement verified by: chest auscultation and capnometry   Cuff volume (mL): 8  Measured from: lips  ETT/EBT  to lips (cm): 21  Number of attempts at approach: 1  Assessment: lips, teeth, and gum same as pre-op and atraumatic intubation

## 2023-04-25 NOTE — ANESTHESIA PREPROCEDURE EVALUATION
Anesthesia Evaluation     history of anesthetic complications: PONV               Airway   Mallampati: I  TM distance: >3 FB  Neck ROM: full  No difficulty expected  Dental - normal exam     Pulmonary - normal exam   Cardiovascular - normal exam        Neuro/Psych  (+) headaches, dizziness/light headedness, psychiatric history Anxiety and ADD,    GI/Hepatic/Renal/Endo      Musculoskeletal     Abdominal    Substance History      OB/GYN          Other                      Anesthesia Plan    ASA 2     general     intravenous induction     Anesthetic plan, risks, benefits, and alternatives have been provided, discussed and informed consent has been obtained with: patient.        CODE STATUS:

## 2023-04-28 LAB
LAB AP CASE REPORT: NORMAL
Lab: NORMAL
PATH REPORT.FINAL DX SPEC: NORMAL
PATH REPORT.GROSS SPEC: NORMAL

## 2023-05-08 ENCOUNTER — OFFICE VISIT (OUTPATIENT)
Dept: GYNECOLOGIC ONCOLOGY | Facility: CLINIC | Age: 43
End: 2023-05-08
Payer: COMMERCIAL

## 2023-05-08 VITALS
HEIGHT: 66 IN | WEIGHT: 242.3 LBS | OXYGEN SATURATION: 98 % | TEMPERATURE: 98 F | DIASTOLIC BLOOD PRESSURE: 85 MMHG | SYSTOLIC BLOOD PRESSURE: 138 MMHG | HEART RATE: 70 BPM | BODY MASS INDEX: 38.94 KG/M2

## 2023-05-08 DIAGNOSIS — N83.8 OVARIAN MASS: Primary | ICD-10-CM

## 2023-05-08 NOTE — PROGRESS NOTES
Age: 43 y.o.  Sex: female  :  1980  MRN: [id number]       REFERRING PHYSICIAN: No ref. provider found       Ghazala Early presents to [facility] for post op appt. She is doing well. No issues with pain, urinary or bowel issues.       Oncology/Hematology History Overview Note   Ghazala Early is a 42 y/o female, referred by Zayda Walters for Pelvic Pain and an ovarian cyst.    • 23: TVUS - Uterus 8 cm x 5.1 x 5 cm.  ET 4.8 mm. R ovary not visualized.  L ovary not visualized.  Uterus is anteverted and heterogenous in appearance.large 10.5 x 8.5 x 8.8 cm cyst in pelvis.  • 23: Robotic assisted  total  laparoscopic hysterectomy, bilateral salpingo-oophorectomy. Benign Endometrioma        PATHOLOGY -  1. Uterus, Cervix, Bilateral Fallopian Tubes and Left Ovary, Robotic Assisted Total Laparoscopic Hysterectomy, Bilateral Salpingectomy and Left Oophorectomy (including FS):  A. Ovary with endometrioma.  B. Endometrium with scar, consistent with prior ablation procedure.  C. Uterine serosa with adhesions.  D. Left tubo-ovarian complex with adhesions and endometriosis.              E. Right fallopian tube with paratubal cysts.              F. Cervix with no significant histopathologic changes.      23: Post Op/ sx 23      Date            Value      Ref Range            Status  3/2/23             39.2        0.0 - 38.1 U/mL          Final           Past Medical History:  Past Medical History:   Diagnosis Date   • ADD (attention deficit disorder)    • Allergic    • Anemia    • Anxiety    • Eczema    • Endometrial mass    • Heavy menstrual bleeding    • History of cellulitis 2002     INCISION   • History of COVID-19 2020   • Migraines    • PONV (postoperative nausea and vomiting)    • Seasonal allergies    • Sinus drainage    • Vertigo        Past Surgical History:  Past Surgical History:   Procedure Laterality Date   •  SECTION  2002   •  CHOLECYSTECTOMY     • D & C HYSTEROSCOPY ENDOMETRIAL ABLATION N/A 06/13/2018    Procedure: HYSTEROSCOPY DILATATION/CURETTAGE/NOVASURE, MYOSURE;  Surgeon: Zayda Walters MD;  [facility]: [facility];  Service: Obstetrics/Gynecology   • TOTAL LAPAROSCOPIC HYSTERECTOMY N/A 4/25/2023    Procedure: Exam under anesthesia, Robotic assisted total laparoscopic hysterectomy, bilateral salpingectomy, left  oophorecotmy,;  Surgeon: Viri Babb DO;  Location: [facility] OR;  Service: Robotics - DaVinci;  Laterality: N/A;   • WISDOM TOOTH EXTRACTION          Current Meds:    Current Outpatient Medications:   •  acetaminophen (TYLENOL) 325 MG tablet, Take 2 tablets by mouth Every 6 (Six) Hours As Needed for Mild Pain., Disp: , Rfl:   •  CBD (cannabidiol) oral oil, Take  by mouth., Disp: , Rfl:   •  cetirizine (zyrTEC) 10 MG tablet, Take 1 tablet by mouth Daily As Needed for Allergies., Disp: , Rfl:   •  diphenhydrAMINE (BENADRYL) 25 mg capsule, Take 1 capsule by mouth Every 6 (Six) Hours As Needed for Itching., Disp: , Rfl:   •  fluticasone (FLONASE) 50 MCG/ACT nasal spray, 1 spray into the nostril(s) as directed by provider Daily., Disp: , Rfl:   •  Levocetirizine Dihydrochloride (XYZAL ALLERGY 24HR PO), Take 1 capsule by mouth Daily As Needed., Disp: , Rfl:   •  meclizine (ANTIVERT) 12.5 MG tablet, Take 1 tablet by mouth 3 (Three) Times a Day As Needed for Dizziness., Disp: 45 tablet, Rfl: 1  •  melatonin 5 MG tablet tablet, Take 2 tablets by mouth At Night As Needed., Disp: , Rfl:   •  HYDROcodone-acetaminophen (Norco) 5-325 MG per tablet, Take 1 tablet by mouth Every 6 (Six) Hours As Needed for Moderate Pain. (Patient not taking: Reported on 5/8/2023), Disp: 20 tablet, Rfl: 0      ALLERGIES:  Allergies   Allergen Reactions   • Sulfa Antibiotics Anaphylaxis   • Azithromycin Other (See Comments)     Lack of therapeutic value   • Ciprofloxacin Rash   • Penicillins Rash         ROS:  CONSTITUTIONAL:  Denies fever or  "chills.   NEUROLOGIC:  Denies headache, focal weakness or sensory changes.   EYES:  Denies change in visual acuity.  HEENT:  Denies nasal congestion or sore throat.   RESPIRATORY:  Denies cough or shortness of breath.   CARDIOVASCULAR:  Denies chest pain or edema.   GI:  Denies abdominal pain, nausea, vomiting, bloody stools or diarrhea.   :  Denies dysuria, leaking or incontinence.  MUSCULOSKELETAL:  Denies back pain or joint pain.   INTEGUMENT:  Denies rash.   ENDOCRINE:  Denies polyuria or polydipsia.   LYMPHATIC:  Denies swollen glands or lymphedema.   PSYCHIATRIC:  Denies depression or anxiety.      PHYSICAL EXAM:  Vitals:    05/08/23 1104   BP: 138/85   Pulse: 70   Temp: 98 °F (36.7 °C)   TempSrc: Oral   SpO2: 98%   Weight: 110 kg (242 lb 4.8 oz)   Height: 167.6 cm (66\")     Body mass index is 39.11 kg/m².      3/15/2023    10:52 AM   Current Status   ECOG score 0       GEN: alert and oriented x 3, normal affect, well nourished and hydrated.  CARDIO: regular rate and rhythm.  PULM: Lungs CTA b.l, no RRW   ABD:Soft, nontender, nondistended. Incisions well healed.,   GYN: defer   EXT: No petechiae, bruising, rash, candida. No CCE.           Result Review :  The pertinent labs, images, and/or pathology as noted in the oncology history were reviewed independently and discussed with the patient.   Viri Babb DO   05/08/2023    [facility]:   Path reviewed benign endometriom     [facility]:  n/a        ASSESSMENT :  • Normal post op exam       PLAN :  • Has right ovary in place   • Continue full 6 weeks of post op care  • Will follow with Gyn for WWE and mammo   • RTO PRN             Viri Babb D.O  5/8/2023    Gynecologic Oncology   [address] [place] [address]  [phone] office            "

## 2023-07-26 ENCOUNTER — PATIENT ROUNDING (BHMG ONLY) (OUTPATIENT)
Dept: FAMILY MEDICINE CLINIC | Facility: CLINIC | Age: 43
End: 2023-07-26
Payer: COMMERCIAL

## 2023-07-26 NOTE — PROGRESS NOTES
A [employer] message was sent to the patient for PATIENT ROUNDING with [facility].

## 2023-07-31 RX ORDER — FENOFIBRATE 160 MG/1
160 TABLET ORAL DAILY
Qty: 90 TABLET | Refills: 1 | Status: SHIPPED | OUTPATIENT
Start: 2023-07-31

## 2024-03-07 RX ORDER — FENOFIBRATE 160 MG/1
160 TABLET ORAL DAILY
Qty: 30 TABLET | Refills: 0 | OUTPATIENT
Start: 2024-03-07

## 2024-03-13 ENCOUNTER — OFFICE VISIT (OUTPATIENT)
Dept: FAMILY MEDICINE CLINIC | Facility: CLINIC | Age: 44
End: 2024-03-13
Payer: COMMERCIAL

## 2024-03-13 VITALS
TEMPERATURE: 98.1 F | HEIGHT: 65 IN | DIASTOLIC BLOOD PRESSURE: 92 MMHG | SYSTOLIC BLOOD PRESSURE: 148 MMHG | HEART RATE: 99 BPM | WEIGHT: 252 LBS | OXYGEN SATURATION: 98 % | BODY MASS INDEX: 41.99 KG/M2

## 2024-03-13 DIAGNOSIS — I10 PRIMARY HYPERTENSION: ICD-10-CM

## 2024-03-13 DIAGNOSIS — J30.2 SEASONAL ALLERGIES: ICD-10-CM

## 2024-03-13 DIAGNOSIS — K21.9 GASTROESOPHAGEAL REFLUX DISEASE, UNSPECIFIED WHETHER ESOPHAGITIS PRESENT: ICD-10-CM

## 2024-03-13 DIAGNOSIS — T75.3XXA MOTION SICKNESS, INITIAL ENCOUNTER: ICD-10-CM

## 2024-03-13 DIAGNOSIS — E78.2 MIXED HYPERLIPIDEMIA: Primary | ICD-10-CM

## 2024-03-13 RX ORDER — MECLIZINE HCL 12.5 MG/1
12.5 TABLET ORAL 3 TIMES DAILY PRN
Qty: 90 TABLET | Refills: 3 | Status: SHIPPED | OUTPATIENT
Start: 2024-03-13

## 2024-03-13 RX ORDER — FLUTICASONE PROPIONATE 50 MCG
1 SPRAY, SUSPENSION (ML) NASAL DAILY
Qty: 16 G | Refills: 2 | Status: SHIPPED | OUTPATIENT
Start: 2024-03-13

## 2024-03-13 RX ORDER — CHLORCYCLIZINE HYDROCHLORIDE AND PSEUDOEPHEDRINE HYDROCHLORIDE 25; 60 MG/1; MG/1
1 TABLET ORAL 2 TIMES DAILY PRN
Qty: 42 TABLET | Refills: 1 | Status: SHIPPED | OUTPATIENT
Start: 2024-03-13

## 2024-03-13 RX ORDER — FENOFIBRATE 160 MG/1
160 TABLET ORAL DAILY
Qty: 90 TABLET | Refills: 3 | Status: SHIPPED | OUTPATIENT
Start: 2024-03-13

## 2024-03-13 RX ORDER — LOSARTAN POTASSIUM 50 MG/1
50 TABLET ORAL DAILY
Qty: 90 TABLET | Refills: 1 | Status: SHIPPED | OUTPATIENT
Start: 2024-03-13

## 2024-03-13 RX ORDER — SCOLOPAMINE TRANSDERMAL SYSTEM 1 MG/1
1 PATCH, EXTENDED RELEASE TRANSDERMAL
Qty: 4 PATCH | Refills: 1 | Status: SHIPPED | OUTPATIENT
Start: 2024-03-13

## 2024-03-13 RX ORDER — OMEPRAZOLE 20 MG/1
20 CAPSULE, DELAYED RELEASE ORAL DAILY
Qty: 90 CAPSULE | Refills: 3 | Status: SHIPPED | OUTPATIENT
Start: 2024-03-13

## 2024-03-13 NOTE — PROGRESS NOTES
Chief Complaint  Med Refill and Heartburn (In teeth.. dentist told pt to tell pcp)    Subjective        HPI   Ghazala presents to [facility] as a 43-year-old female for follow-up, review/order labs, refill medications.  Overall doing okay    Blood pressure has been elevated on last couple of visits and at the dentist.  She does have occasional headaches.  She denies any blurred vision no dizziness no flushing no chest pain or pressure  She never previously been on blood pressure medication and is agreeable to trying that today  She does have a blood pressure cuff at home to monitor her blood pressure.    Hyperlipidemia-she is continue to work on her diet and exercise.  She is taking fenofibric as directed.    GERD-she did recently go to the dentist and they state that her teeth did show signs of vision from reflux/acid she is not currently taking any PPI or H2 blocker.  She is agreeable to initiating that today    She is planning to travel on a cruise upcoming.  She would like to have a prescription for meclizine/Patches as needed.  She has had history of dizziness with anesthesia/travel      She does have seasonal allergies.  She does take daily allergy medication, Flonase as needed during season changes.  She would like to have Stahist on hand for increased symptoms.  Feels this medication works well for her and denies any side effects.    She is not fasting today and will return for labs    She has no other acute complaints     The following portions of the patient's history were reviewed and updated as appropriate: allergies, current medications, past family history, past medical history, past social history, past surgical history, and problem list      Review of Systems   Constitutional:  Negative for chills, fatigue and fever.   Eyes:  Negative for visual disturbance.   Respiratory:  Negative for cough, shortness of breath, wheezing and stridor.    Cardiovascular:  Negative for  "chest pain, palpitations and leg swelling.   Gastrointestinal:  Negative for abdominal pain, diarrhea, nausea and vomiting.   Skin:  Negative for rash.   Neurological:  Negative for dizziness.   Psychiatric/Behavioral:  Negative for self-injury, sleep disturbance and suicidal ideas. The patient is not nervous/anxious.         Objective   Vital Signs:   Vitals:    03/13/24 1016   BP: 148/92   Pulse: 99   Temp: 98.1 °F (36.7 °C)   SpO2: 98%   Weight: 114 kg (252 lb)   Height: 165.1 cm (65\")            3/15/2023    10:54 AM   PHQ-2/PHQ-9 Depression Screening   Little Interest or Pleasure in Doing Things 0-->not at all   Feeling Down, Depressed or Hopeless 0-->not at all   PHQ-9: Brief Depression Severity Measure Score 0       Class 3 Severe Obesity (BMI >=40). Obesity-related health conditions include the following: hypertension and dyslipidemias. Obesity is unchanged. BMI is is above average; BMI management plan is completed. We discussed portion control and increasing exercise.        Physical Exam  Vitals reviewed.   Constitutional:       General: She is not in acute distress.  Eyes:      Conjunctiva/sclera: Conjunctivae normal.   Neck:      Thyroid: No thyromegaly.      Vascular: No carotid bruit.   Cardiovascular:      Rate and Rhythm: Normal rate and regular rhythm.      Heart sounds: Normal heart sounds. No murmur heard.  Pulmonary:      Effort: Pulmonary effort is normal. No respiratory distress.      Breath sounds: Normal breath sounds. No stridor. No wheezing, rhonchi or rales.   Chest:      Chest wall: No tenderness.   Neurological:      Mental Status: She is alert and oriented to person, place, and time.   Psychiatric:         Attention and Perception: Attention normal.         Mood and Affect: Mood normal.          Result Review :     The following data was reviewed by: SARAHY Humphrey on 03/13/2024:      Hemoglobin A1c (07/24/2023 08:28)  TSH (07/24/2023 08:28)  CBC and Differential (07/24/2023 " 08:28)  Lipid panel (2023 08:28)  Comprehensive metabolic panel (2023 08:28)  Tissue Pathology Exam (2023 09:08)  hCG, Serum, Qualitative (2023 06:22)  Type and screen (2023 08:38)  CBC (No Diff) (2023 08:38)  Basic Metabolic Panel (2023 08:38)     Alkaline phos is slightly elevated but liver enzymes and bilirubin ok.  Triglycerides are high.  Need to start medication to lower triglyceride level called fenofibrate.  Remainder of labs ok.     Assessment and Plan    Assessment & Plan  Mixed hyperlipidemia   Continue to work on lower cholesterol diet and exercise.  Goal is greater than 150 minutes moderate intensity weekly     Primary hypertension  Start Losartan  Monitor BP at home bring log to next visit      Gastroesophageal reflux disease, unspecified whether esophagitis present  Avoid triggers including caffeine, nicotine, alcohol, spicy foods, red sauce    Motion sickness, initial encounter  Scopolamine patch and as needed as needed for travel illness/motion sickness  Upcoming cruise  Seasonal allergies  Recommend daily allergy medication during season change  Flonase and Stahist as needed for    Orders Placed This Encounter   Procedures    Comprehensive metabolic panel    Lipid panel    TSH    T4, Free    CBC and Differential     New Medications Ordered This Visit   Medications    fenofibrate 160 MG tablet     Sig: Take 1 tablet by mouth Daily.     Dispense:  90 tablet     Refill:  3    meclizine (ANTIVERT) 12.5 MG tablet     Sig: Take 1 tablet by mouth 3 (Three) Times a Day As Needed for Dizziness.     Dispense:  90 tablet     Refill:  3    Scopolamine 1 MG/3DAYS patch     Sig: Place 1 patch on the skin as directed by provider Every 72 (Seventy-Two) Hours.     Dispense:  4 patch     Refill:  1    omeprazole (priLOSEC) 20 MG capsule     Sig: Take 1 capsule by mouth Daily.     Dispense:  90 capsule     Refill:  3    fluticasone (FLONASE) 50 MCG/ACT nasal spray     Si  spray into the nostril(s) as directed by provider Daily.     Dispense:  16 g     Refill:  2    Chlorcyclizine-Pseudoephed (Stahist AD) 25-60 MG tablet     Sig: Take 1 tablet by mouth 2 (Two) Times a Day As Needed (allergies).     Dispense:  42 tablet     Refill:  1    losartan (Cozaar) 50 MG tablet     Sig: Take 1 tablet by mouth Daily.     Dispense:  90 tablet     Refill:  1          Follow Up   Return in about 4 weeks (around 4/10/2024) for Follow up for B/p Check.  Patient was given instructions and counseling regarding her condition or for health maintenance advice. Please see specific information pulled into the AVS if appropriate.

## 2024-06-24 ENCOUNTER — OFFICE VISIT (OUTPATIENT)
Dept: FAMILY MEDICINE CLINIC | Facility: CLINIC | Age: 44
End: 2024-06-24
Payer: COMMERCIAL

## 2024-06-24 ENCOUNTER — TRANSCRIBE ORDERS (OUTPATIENT)
Dept: ADMINISTRATIVE | Facility: HOSPITAL | Age: 44
End: 2024-06-24
Payer: COMMERCIAL

## 2024-06-24 ENCOUNTER — HOSPITAL ENCOUNTER (OUTPATIENT)
Dept: CARDIOLOGY | Facility: HOSPITAL | Age: 44
Discharge: HOME | End: 2024-06-24
Admitting: ORTHOPAEDIC SURGERY
Payer: COMMERCIAL

## 2024-06-24 VITALS
OXYGEN SATURATION: 100 % | BODY MASS INDEX: 38.99 KG/M2 | DIASTOLIC BLOOD PRESSURE: 84 MMHG | SYSTOLIC BLOOD PRESSURE: 118 MMHG | HEART RATE: 74 BPM | RESPIRATION RATE: 16 BRPM | WEIGHT: 234 LBS | HEIGHT: 65 IN

## 2024-06-24 DIAGNOSIS — S93.491A SPRAIN OF OTHER LIGAMENT OF RIGHT ANKLE, INITIAL ENCOUNTER: ICD-10-CM

## 2024-06-24 DIAGNOSIS — M79.89 PAIN AND SWELLING OF LOWER LEG, RIGHT: Primary | ICD-10-CM

## 2024-06-24 DIAGNOSIS — M79.661 PAIN AND SWELLING OF LOWER LEG, RIGHT: Primary | ICD-10-CM

## 2024-06-24 DIAGNOSIS — S99.911A INJURY OF RIGHT ANKLE, INITIAL ENCOUNTER: Primary | ICD-10-CM

## 2024-06-24 DIAGNOSIS — M79.661 PAIN AND SWELLING OF LOWER LEG, RIGHT: ICD-10-CM

## 2024-06-24 DIAGNOSIS — M79.89 PAIN AND SWELLING OF LOWER LEG, RIGHT: ICD-10-CM

## 2024-06-24 LAB
BH CV LOWER VASCULAR LEFT COMMON FEMORAL AUGMENT: NORMAL
BH CV LOWER VASCULAR LEFT COMMON FEMORAL COMPETENT: NORMAL
BH CV LOWER VASCULAR LEFT COMMON FEMORAL COMPRESS: NORMAL
BH CV LOWER VASCULAR LEFT COMMON FEMORAL PHASIC: NORMAL
BH CV LOWER VASCULAR LEFT COMMON FEMORAL SPONT: NORMAL
BH CV LOWER VASCULAR RIGHT COMMON FEMORAL AUGMENT: NORMAL
BH CV LOWER VASCULAR RIGHT COMMON FEMORAL COMPETENT: NORMAL
BH CV LOWER VASCULAR RIGHT COMMON FEMORAL COMPRESS: NORMAL
BH CV LOWER VASCULAR RIGHT COMMON FEMORAL PHASIC: NORMAL
BH CV LOWER VASCULAR RIGHT COMMON FEMORAL SPONT: NORMAL
BH CV LOWER VASCULAR RIGHT DISTAL FEMORAL COMPRESS: NORMAL
BH CV LOWER VASCULAR RIGHT GASTRONEMIUS COMPRESS: NORMAL
BH CV LOWER VASCULAR RIGHT GREATER SAPH AK COMPRESS: NORMAL
BH CV LOWER VASCULAR RIGHT GREATER SAPH BK COMPRESS: NORMAL
BH CV LOWER VASCULAR RIGHT LESSER SAPH COMPRESS: NORMAL
BH CV LOWER VASCULAR RIGHT MID FEMORAL AUGMENT: NORMAL
BH CV LOWER VASCULAR RIGHT MID FEMORAL COMPETENT: NORMAL
BH CV LOWER VASCULAR RIGHT MID FEMORAL COMPRESS: NORMAL
BH CV LOWER VASCULAR RIGHT MID FEMORAL PHASIC: NORMAL
BH CV LOWER VASCULAR RIGHT MID FEMORAL SPONT: NORMAL
BH CV LOWER VASCULAR RIGHT PERONEAL COMPRESS: NORMAL
BH CV LOWER VASCULAR RIGHT POPLITEAL AUGMENT: NORMAL
BH CV LOWER VASCULAR RIGHT POPLITEAL COMPETENT: NORMAL
BH CV LOWER VASCULAR RIGHT POPLITEAL COMPRESS: NORMAL
BH CV LOWER VASCULAR RIGHT POPLITEAL PHASIC: NORMAL
BH CV LOWER VASCULAR RIGHT POPLITEAL SPONT: NORMAL
BH CV LOWER VASCULAR RIGHT POSTERIOR TIBIAL COMPRESS: NORMAL
BH CV LOWER VASCULAR RIGHT PROFUNDA FEMORAL COMPRESS: NORMAL
BH CV LOWER VASCULAR RIGHT PROXIMAL FEMORAL COMPRESS: NORMAL
BH CV LOWER VASCULAR RIGHT SAPHENOFEMORAL JUNCTION COMPRESS: NORMAL
BH CV VAS PRELIMINARY FINDINGS SCRIPTING: 1

## 2024-06-24 PROCEDURE — 93971 EXTREMITY STUDY: CPT

## 2024-06-24 PROCEDURE — 99213 OFFICE O/P EST LOW 20 MIN: CPT | Performed by: NURSE PRACTITIONER

## 2024-06-24 PROCEDURE — 93971 EXTREMITY STUDY: CPT | Performed by: STUDENT IN AN ORGANIZED HEALTH CARE EDUCATION/TRAINING PROGRAM

## 2024-06-24 NOTE — PROGRESS NOTES
Subjective   Ghazala Early is a 44 y.o. female.     Ankle Pain        Ankle Pain (R foot/ankle injury, fell on Friday.  Swelling up into calf with cramping and brusing)  Answers submitted by the patient for this visit:  Other (Submitted on 6/23/2024)  Please describe your symptoms.: Twisted my ankle when i missed a step down.  Felt a pop when it happened. Its now super swollen and was bruised. Cant put any weight on it right now.  , Also right after it hapoened and I was standing back up evaluating my injury, I got tunnel vision and hearing started to go out.  I had to sit down quickly because I felt like I was going to pass out  Have you had these symptoms before?: No  How long have you been having these symptoms?: 1-4 days  Please list any medications you are currently taking for this condition.: Clementine been taking ibuprofen for pain and swelling  Please describe any probable cause for these symptoms. : Ankle injury  Primary Reason for Visit (Submitted on 6/23/2024)  What is the primary reason for your visit?: Other      States the pain has improved but she is still having tightness and cramping in the legs that she feels is from the swelling.  She initially had trouble bearing weight when injury first occurred.  Swelling has not improved.    The following portions of the patient's history were reviewed and updated as appropriate: allergies, current medications, past family history, past medical history, past social history, past surgical history, and problem list.    Review of Systems   Constitutional:  Negative for unexpected weight change.   Cardiovascular:  Positive for leg swelling.   Musculoskeletal:  Positive for arthralgias, gait problem and joint swelling.   Psychiatric/Behavioral:  Negative for behavioral problems.        Objective   Physical Exam  Vitals and nursing note reviewed.   Constitutional:       Appearance: She is well-developed.   Pulmonary:      Effort: Pulmonary effort is normal.    Musculoskeletal:      Right ankle: Swelling present. Tenderness present. No lateral malleolus or medial malleolus tenderness. Decreased range of motion.      Right Achilles Tendon: No tenderness.      Right foot: Normal range of motion. Swelling and tenderness present.        Legs:    Neurological:      Mental Status: She is alert and oriented to person, place, and time.   Psychiatric:         Mood and Affect: Mood normal.         Behavior: Behavior normal.         Thought Content: Thought content normal.         Judgment: Judgment normal.         Assessment & Plan   Diagnoses and all orders for this visit:    1. Injury of right ankle, initial encounter (Primary)    2. Sprain of other ligament of right ankle, initial encounter        Patient sent to [facility].  She is to arrive at 1 PM.  Ortho office notified the patient is coming and sent demographics.

## 2024-08-13 DIAGNOSIS — I10 PRIMARY HYPERTENSION: ICD-10-CM

## 2024-08-13 RX ORDER — LOSARTAN POTASSIUM 50 MG/1
50 TABLET ORAL DAILY
Qty: 90 TABLET | Refills: 1 | Status: SHIPPED | OUTPATIENT
Start: 2024-08-13

## 2024-08-13 NOTE — TELEPHONE ENCOUNTER
Rx Refill Note  Requested Prescriptions      No prescriptions requested or ordered in this encounter      Last office visit with prescribing clinician: 6/24/2024   Last telemedicine visit with prescribing clinician: Visit date not found   Next office visit with prescribing clinician: Visit date not found                         Would you like a call back once the refill request has been completed: [] Yes [] No    If the office needs to give you a call back, can they leave a voicemail: [] Yes [] No    Dez Roberson MA  08/13/24, 13:40 EDT

## 2024-11-27 DIAGNOSIS — J30.2 SEASONAL ALLERGIES: ICD-10-CM

## 2024-11-27 RX ORDER — CHLORCYCLIZINE HYDROCHLORIDE AND PSEUDOEPHEDRINE HYDROCHLORIDE 25; 60 MG/1; MG/1
1 TABLET ORAL 2 TIMES DAILY PRN
Qty: 42 TABLET | Refills: 1 | Status: SHIPPED | OUTPATIENT
Start: 2024-11-27

## 2024-11-27 NOTE — TELEPHONE ENCOUNTER
Rx Refill Note  Requested Prescriptions      No prescriptions requested or ordered in this encounter      Last office visit with prescribing clinician: 6/24/2024   Last telemedicine visit with prescribing clinician: Visit date not found   Next office visit with prescribing clinician: Visit date not found                         Would you like a call back once the refill request has been completed: [] Yes [] No    If the office needs to give you a call back, can they leave a voicemail: [] Yes [] No    Dez Roberson MA  11/27/24, 13:38 EST

## 2024-12-03 ENCOUNTER — APPOINTMENT (OUTPATIENT)
Dept: WOMENS IMAGING | Facility: HOSPITAL | Age: 44
End: 2024-12-03
Payer: COMMERCIAL

## 2024-12-03 PROCEDURE — 77061 BREAST TOMOSYNTHESIS UNI: CPT | Performed by: RADIOLOGY

## 2024-12-03 PROCEDURE — 77065 DX MAMMO INCL CAD UNI: CPT | Performed by: RADIOLOGY

## 2024-12-03 PROCEDURE — 76642 ULTRASOUND BREAST LIMITED: CPT | Performed by: RADIOLOGY

## 2024-12-03 PROCEDURE — G0279 TOMOSYNTHESIS, MAMMO: HCPCS | Performed by: RADIOLOGY

## 2025-02-05 DIAGNOSIS — J30.2 SEASONAL ALLERGIES: ICD-10-CM

## 2025-02-05 RX ORDER — CHLORCYCLIZINE HYDROCHLORIDE AND PSEUDOEPHEDRINE HYDROCHLORIDE 25; 60 MG/1; MG/1
1 TABLET ORAL 2 TIMES DAILY PRN
Qty: 42 TABLET | Refills: 1 | Status: SHIPPED | OUTPATIENT
Start: 2025-02-05

## 2025-02-26 DIAGNOSIS — I10 PRIMARY HYPERTENSION: ICD-10-CM

## 2025-02-27 RX ORDER — LOSARTAN POTASSIUM 50 MG/1
50 TABLET ORAL DAILY
Qty: 30 TABLET | Refills: 0 | Status: SHIPPED | OUTPATIENT
Start: 2025-02-27

## 2025-04-01 DIAGNOSIS — K21.9 GASTROESOPHAGEAL REFLUX DISEASE, UNSPECIFIED WHETHER ESOPHAGITIS PRESENT: ICD-10-CM

## 2025-04-01 RX ORDER — OMEPRAZOLE 20 MG/1
20 CAPSULE, DELAYED RELEASE ORAL DAILY
Qty: 30 CAPSULE | Refills: 0 | Status: SHIPPED | OUTPATIENT
Start: 2025-04-01

## 2025-04-28 DIAGNOSIS — I10 PRIMARY HYPERTENSION: ICD-10-CM

## 2025-04-28 RX ORDER — LOSARTAN POTASSIUM 50 MG/1
50 TABLET ORAL DAILY
Qty: 30 TABLET | Refills: 0 | Status: SHIPPED | OUTPATIENT
Start: 2025-04-28

## 2025-04-28 NOTE — TELEPHONE ENCOUNTER
ARB Protocol Aybxgr4704/28/2025 12:02 PM   Protocol Details Normal serum potassium on file within the past year    Normal serum creatinine on file within the past year          Sent patient a msg in [employer] to make a follow up appt    3/13/24 appt said to return in 4 weeks for BP check.

## 2025-04-28 NOTE — TELEPHONE ENCOUNTER
Caller: Ghazala Early    Relationship: Self    Best call back number: [phone]     Requested Prescriptions:   Requested Prescriptions     Pending Prescriptions Disp Refills    losartan (COZAAR) 50 MG tablet 30 tablet 0     Sig: Take 1 tablet by mouth Daily.        Pharmacy where request should be sent: [facility] - [place], [place] - [address] - [phone]  - [phone] FX     Last office visit with prescribing clinician: 6/24/2024   Last telemedicine visit with prescribing clinician: Visit date not found   Next office visit with prescribing clinician: Visit date not found     Additional details provided by patient: PATIENT IS OUT OF MEDICINE    Does the patient have less than a 3 day supply:  [x] Yes  [] No      Jose Alberto Horan Rep   04/28/25 12:01 EDT

## 2025-05-24 DIAGNOSIS — K21.9 GASTROESOPHAGEAL REFLUX DISEASE, UNSPECIFIED WHETHER ESOPHAGITIS PRESENT: ICD-10-CM

## 2025-05-27 RX ORDER — OMEPRAZOLE 20 MG/1
20 CAPSULE, DELAYED RELEASE ORAL DAILY
Qty: 30 CAPSULE | Refills: 0 | OUTPATIENT
Start: 2025-05-27

## 2025-05-27 NOTE — TELEPHONE ENCOUNTER
Rx Refill Note  Requested Prescriptions     Pending Prescriptions Disp Refills    omeprazole (priLOSEC) 20 MG capsule [Pharmacy Med Name: omeprazole 20 mg capsule,delayed release] 30 capsule 0     Sig: TAKE ONE CAPSULE BY MOUTH DAILY      Last office visit with prescribing clinician: 3/13/2024   Last telemedicine visit with prescribing clinician: Visit date not found   Next office visit with prescribing clinician: Visit date not found                         Would you like a call back once the refill request has been completed: [] Yes [] No    If the office needs to give you a call back, can they leave a voicemail: [] Yes [] No    Dez Roberson MA  05/27/25, 08:31 EDT

## 2025-05-28 DIAGNOSIS — I10 PRIMARY HYPERTENSION: ICD-10-CM

## 2025-05-28 RX ORDER — LOSARTAN POTASSIUM 50 MG/1
50 TABLET ORAL DAILY
Qty: 30 TABLET | Refills: 0 | OUTPATIENT
Start: 2025-05-28

## 2025-05-28 NOTE — TELEPHONE ENCOUNTER
Patient comment: Took last one last night.   Pharmacy requested refill a couoke days ago     ARB Protocol Rtsoam2105/28/2025 10:07 AM   Protocol Details Normal serum potassium on file within the past year    Normal serum creatinine on file within the past year      nEEDS APPT  Rx Refill Note  Requested Prescriptions     Pending Prescriptions Disp Refills    losartan (COZAAR) 50 MG tablet 30 tablet 0     Sig: Take 1 tablet by mouth Daily.      Last office visit with prescribing clinician: 6/24/2024   Last telemedicine visit with prescribing clinician: Visit date not found   Next office visit with prescribing clinician: Visit date not found         JOEY Arita(R)  05/28/25, 11:28 EDT

## 2025-05-29 DIAGNOSIS — I10 PRIMARY HYPERTENSION: ICD-10-CM

## 2025-05-29 RX ORDER — LOSARTAN POTASSIUM 50 MG/1
50 TABLET ORAL DAILY
Qty: 30 TABLET | Refills: 0 | Status: SHIPPED | OUTPATIENT
Start: 2025-05-29

## 2025-05-29 NOTE — TELEPHONE ENCOUNTER
Caller: Ghazala Early    Relationship: Self    Best call back number: [phone]    Requested Prescriptions:   Requested Prescriptions     Pending Prescriptions Disp Refills    losartan (COZAAR) 50 MG tablet 30 tablet 0     Sig: Take 1 tablet by mouth Daily.        Pharmacy where request should be sent: [facility] - [place], [place] - [address] [facility] - [phone]  - [phone] FX     Last office visit with prescribing clinician: 6/24/2024   Last telemedicine visit with prescribing clinician: Visit date not found   Next office visit with prescribing clinician: 6/3/2025     Additional details provided by patient:   PATIENT SCHEDULED FOR AN APPOINTMNT ON 6.3.25.     OUT FOR TWO DAYS    Does the patient have less than a 3 day supply:  [x] Yes  [] No    Would you like a call back once the refill request has been completed: [x] Yes [] No    If the office needs to give you a call back, can they leave a voicemail: [x] Yes [] No    Jose Alberto Razo Rep   05/29/25 12:35 EDT

## 2025-06-03 ENCOUNTER — OFFICE VISIT (OUTPATIENT)
Dept: FAMILY MEDICINE CLINIC | Facility: CLINIC | Age: 45
End: 2025-06-03
Payer: COMMERCIAL

## 2025-06-03 VITALS
OXYGEN SATURATION: 99 % | WEIGHT: 228.4 LBS | HEIGHT: 65 IN | DIASTOLIC BLOOD PRESSURE: 90 MMHG | BODY MASS INDEX: 38.05 KG/M2 | HEART RATE: 90 BPM | TEMPERATURE: 98 F | SYSTOLIC BLOOD PRESSURE: 130 MMHG

## 2025-06-03 DIAGNOSIS — E78.1 HYPERTRIGLYCERIDEMIA: Primary | ICD-10-CM

## 2025-06-03 DIAGNOSIS — K21.9 GASTROESOPHAGEAL REFLUX DISEASE, UNSPECIFIED WHETHER ESOPHAGITIS PRESENT: ICD-10-CM

## 2025-06-03 DIAGNOSIS — I10 PRIMARY HYPERTENSION: ICD-10-CM

## 2025-06-03 DIAGNOSIS — T75.3XXA MOTION SICKNESS, INITIAL ENCOUNTER: ICD-10-CM

## 2025-06-03 DIAGNOSIS — E78.2 MIXED HYPERLIPIDEMIA: ICD-10-CM

## 2025-06-03 RX ORDER — LOSARTAN POTASSIUM 50 MG/1
50 TABLET ORAL DAILY
Qty: 90 TABLET | Refills: 3 | Status: SHIPPED | OUTPATIENT
Start: 2025-06-03

## 2025-06-03 RX ORDER — SCOPOLAMINE 1 MG/3D
1 PATCH, EXTENDED RELEASE TRANSDERMAL
Qty: 4 PATCH | Refills: 1 | Status: SHIPPED | OUTPATIENT
Start: 2025-06-03

## 2025-06-03 RX ORDER — ICOSAPENT ETHYL 1 G/1
2 CAPSULE ORAL 2 TIMES DAILY WITH MEALS
Qty: 120 CAPSULE | Refills: 5 | Status: SHIPPED | OUTPATIENT
Start: 2025-06-03

## 2025-06-03 RX ORDER — OMEPRAZOLE 20 MG/1
20 CAPSULE, DELAYED RELEASE ORAL DAILY
Qty: 90 CAPSULE | Refills: 3 | Status: SHIPPED | OUTPATIENT
Start: 2025-06-03

## 2025-06-03 NOTE — PROGRESS NOTES
"Subjective   Ghazlaa Early is a 45 y.o. female.     History of Present Illness    Since the last visit, she has overall felt well.  She has GERD controlled on PPI Rx and HTN that has been well controlled on losartan.  It is a little elevated today but she just came from dentist office after having root canal.  She does not check at home but does have BP monitor.  She has also been getting compounded semaglutide through a weight loss clinic.  She would like to be changed to Wegovy and submitted through her insurance.  She states she is on the equivalent of second to highest dose.  she has been compliant with current medications have reviewed them.  The patient denies medication side effects.  Will refill medications. /90   Pulse 90   Temp 98 °F (36.7 °C) (Temporal)   Ht 165.1 cm (65\")   Wt 104 kg (228 lb 6.4 oz)   LMP 04/17/2023 (Approximate) Comment: light flow, dark blood  SpO2 99%   BMI 38.01 kg/m² .          Results for orders placed or performed during the hospital encounter of 06/24/24   Duplex venous lower extremity right CAR    Collection Time: 06/24/24  3:35 PM   Result Value Ref Range    Right Common Femoral Spont Y     Right Common Femoral Competent Y     Right Common Femoral Phasic Y     Right Common Femoral Compress C     Right Common Femoral Augment Y     Right Saphenofemoral Junction Compress C     Right Profunda Femoral Compress C     Right Proximal Femoral Compress C     Right Mid Femoral Spont Y     Right Mid Femoral Competent Y     Right Mid Femoral Phasic Y     Right Mid Femoral Compress C     Right Mid Femoral Augment Y     Right Distal Femoral Compress C     Right Popliteal Spont Y     Right Popliteal Competent Y     Right Popliteal Phasic Y     Right Popliteal Compress C     Right Popliteal Augment Y     Right Posterior Tibial Compress C     Right Peroneal Compress C     Right Gastronemius Compress C     Right Greater Saph AK Compress C     Right Greater Saph BK Compress C     " Right Lesser Saph Compress C     Left Common Femoral Spont Y     Left Common Femoral Competent Y     Left Common Femoral Phasic Y     Left Common Femoral Compress C     Left Common Femoral Augment Y     BH CV VAS PRELIMINARY FINDINGS SCRIPTING 1.0      She stopped fenofibrate as she received something from insurance cautioning about long-term use.  She cannot remember what the document said.      The following portions of the patient's history were reviewed and updated as appropriate: allergies, current medications, past family history, past medical history, past social history, past surgical history, and problem list.    Review of Systems   Constitutional:  Negative for chills, diaphoresis, fatigue and fever.   HENT:  Negative for congestion and sore throat.    Respiratory:  Negative for cough.    Cardiovascular:  Negative for chest pain.   Gastrointestinal:  Negative for abdominal pain, nausea and vomiting.   Genitourinary:  Negative for dysuria.   Musculoskeletal:  Negative for myalgias and neck pain.   Skin:  Negative for rash.   Neurological:  Negative for weakness, numbness and headaches.       Objective   Physical Exam  Vitals and nursing note reviewed.   Constitutional:       Appearance: She is well-developed.   Cardiovascular:      Rate and Rhythm: Normal rate and regular rhythm.   Pulmonary:      Effort: Pulmonary effort is normal.      Breath sounds: Normal breath sounds.   Neurological:      Mental Status: She is alert and oriented to person, place, and time.   Psychiatric:         Mood and Affect: Mood normal.         Behavior: Behavior normal.         Thought Content: Thought content normal.         Judgment: Judgment normal.         Assessment & Plan   Diagnoses and all orders for this visit:    1. Hypertriglyceridemia (Primary)  -     icosapent ethyl (VASCEPA) 1 g capsule capsule; Take 2 g by mouth 2 (Two) Times a Day With Meals.  Dispense: 120 capsule; Refill: 5    2. Motion sickness, initial  encounter  -     Scopolamine 1 MG/3DAYS patch; Place 1 patch on the skin as directed by provider Every 72 (Seventy-Two) Hours.  Dispense: 4 patch; Refill: 1    3. Primary hypertension  -     losartan (COZAAR) 50 MG tablet; Take 1 tablet by mouth Daily.  Dispense: 90 tablet; Refill: 3  -     Lipid panel  -     CBC and Differential  -     TSH  -     T4, Free    4. Gastroesophageal reflux disease, unspecified whether esophagitis present  -     omeprazole (priLOSEC) 20 MG capsule; Take 1 capsule by mouth Daily.  Dispense: 90 capsule; Refill: 3  -     Lipid panel  -     CBC and Differential  -     TSH  -     T4, Free    5. Body mass index (BMI) of 38.0 to 38.9 in adult  -     Semaglutide-Weight Management 1 MG/0.5ML solution auto-injector; Inject 0.5 mL under the skin into the appropriate area as directed 1 (One) Time Per Week.  Dispense: 2 mL; Refill: 2    6. Mixed hyperlipidemia  -     Lipid panel  -     CBC and Differential  -     TSH  -     T4, Free      Will change to Vascepa.     Will start her on 1 mg of Wegovy to be safe when transitioning her from compound.  She will f/u 2 months after starting medication.

## 2025-06-04 ENCOUNTER — PRIOR AUTHORIZATION (OUTPATIENT)
Dept: FAMILY MEDICINE CLINIC | Facility: CLINIC | Age: 45
End: 2025-06-04
Payer: COMMERCIAL

## 2025-06-04 NOTE — TELEPHONE ENCOUNTER
Ghazala Early (Key: [id number]) - [id number]  Icosapent Ethyl 1GM capsules  status: PA RequestCreated: June 3rd, 2025 [phone]: June 4th, 2025      Ghazala Early (Perera: [id number]) - [id number]  Wegovy 1MG/0.5ML auto-injectors  status: PA RequestCreated: June 3rd, 2025 [phone]: June 4th, 2025

## 2025-06-05 ENCOUNTER — PRIOR AUTHORIZATION (OUTPATIENT)
Dept: FAMILY MEDICINE CLINIC | Facility: CLINIC | Age: 45
End: 2025-06-05
Payer: COMMERCIAL

## 2025-06-05 NOTE — TELEPHONE ENCOUNTER
Ghazala Early (Key: [id number])  PA Case ID #: [id number]  Rx #: [id number]  Need Help? Call us at [phone]  Outcome  Denied on June 4 by [employer] 2017 NCPDP  Request Reference Number: [id number]. ICOSAPENT CAP 1GM is denied for not meeting the prior authorization requirement(s). Details of this decision are in the notice attached below or have been faxed to you.  Drug  Icosapent Ethyl 1GM capsules  ePA cloud logo  Form  [employer] Electronic Prior Authorization Form (2017 NCPDP)  Original Claim Info  70 Alt Options: generic Crestor, genericLipitor, generic Zocor, generic Lovaza,generic Lofibra/Triglide/TricorUse Alt or BENJY CASTILLO Call [phone]-Formulary DrugNon-Formulary Drug  
23.5

## 2025-06-06 ENCOUNTER — TELEPHONE (OUTPATIENT)
Dept: FAMILY MEDICINE CLINIC | Facility: CLINIC | Age: 45
End: 2025-06-06
Payer: COMMERCIAL

## 2025-06-06 NOTE — TELEPHONE ENCOUNTER
Called pt to inform her that insurance had denied the Vascepa and PCP had wanted her to go back on the Fenofibrate

## 2025-06-11 LAB
BASOPHILS # BLD AUTO: 0.05 10*3/MM3 (ref 0–0.2)
BASOPHILS NFR BLD AUTO: 0.7 % (ref 0–1.5)
CHOLEST SERPL-MCNC: 163 MG/DL (ref 0–200)
EOSINOPHIL # BLD AUTO: 0.36 10*3/MM3 (ref 0–0.4)
EOSINOPHIL NFR BLD AUTO: 5.4 % (ref 0.3–6.2)
ERYTHROCYTE [DISTWIDTH] IN BLOOD BY AUTOMATED COUNT: 14.3 % (ref 12.3–15.4)
HCT VFR BLD AUTO: 43.2 % (ref 34–46.6)
HDLC SERPL-MCNC: 35 MG/DL (ref 40–60)
HGB BLD-MCNC: 14.4 G/DL (ref 12–15.9)
IMM GRANULOCYTES # BLD AUTO: 0.02 10*3/MM3 (ref 0–0.05)
IMM GRANULOCYTES NFR BLD AUTO: 0.3 % (ref 0–0.5)
LDLC SERPL CALC-MCNC: 105 MG/DL (ref 0–100)
LYMPHOCYTES # BLD AUTO: 1.29 10*3/MM3 (ref 0.7–3.1)
LYMPHOCYTES NFR BLD AUTO: 19.2 % (ref 19.6–45.3)
MCH RBC QN AUTO: 29.6 PG (ref 26.6–33)
MCHC RBC AUTO-ENTMCNC: 33.3 G/DL (ref 31.5–35.7)
MCV RBC AUTO: 88.9 FL (ref 79–97)
MONOCYTES # BLD AUTO: 0.42 10*3/MM3 (ref 0.1–0.9)
MONOCYTES NFR BLD AUTO: 6.3 % (ref 5–12)
NEUTROPHILS # BLD AUTO: 4.57 10*3/MM3 (ref 1.7–7)
NEUTROPHILS NFR BLD AUTO: 68.1 % (ref 42.7–76)
NRBC BLD AUTO-RTO: 0 /100 WBC (ref 0–0.2)
PLATELET # BLD AUTO: 214 10*3/MM3 (ref 140–450)
RBC # BLD AUTO: 4.86 10*6/MM3 (ref 3.77–5.28)
T4 FREE SERPL-MCNC: 1.06 NG/DL (ref 0.92–1.68)
TRIGL SERPL-MCNC: 130 MG/DL (ref 0–150)
TSH SERPL DL<=0.005 MIU/L-ACNC: 2 UIU/ML (ref 0.27–4.2)
VLDLC SERPL CALC-MCNC: 23 MG/DL (ref 5–40)
WBC # BLD AUTO: 6.71 10*3/MM3 (ref 3.4–10.8)

## 2025-06-12 ENCOUNTER — RESULTS FOLLOW-UP (OUTPATIENT)
Dept: FAMILY MEDICINE CLINIC | Facility: CLINIC | Age: 45
End: 2025-06-12
Payer: COMMERCIAL

## 2025-06-12 NOTE — LETTER
[facility]ala [facility]  [address] [facility] [facility] [address] [address]    June 12, 2025     Dear Ms. Early:    Below are the results from your recent visit:    Resulted Orders   Lipid panel   Result Value Ref Range    Total Cholesterol 163 0 - 200 mg/dL      Comment:      Cholesterol Reference Ranges  (U.S. Department of Health and Human Services ATP III  Classifications)  Desirable          <200 mg/dL  Borderline High    200-239 mg/dL  High Risk          >240 mg/dL  Triglyceride Reference Ranges  (U.S. Department of Health and Human Services ATP III  Classifications)  Normal           <150 mg/dL  Borderline High  150-199 mg/dL  High             200-499 mg/dL  Very High        >500 mg/dL  HDL Reference Ranges  (U.S. Department of Health and Human Services ATP III  Classifications)  Low     <40 mg/dl (major risk factor for CHD)  High    >60 mg/dl ('negative' risk factor for CHD)  LDL Reference Ranges  (U.S. Department of Health and Human Services ATP III  Classifications)  Optimal          <100 mg/dL  Near Optimal     100-129 mg/dL  Borderline High  130-159 mg/dL  High             160-189 mg/dL  Very High        >189 mg/dL  LDL is calculated using the NIH LDL-C calculation.      Triglycerides 130 0 - 150 mg/dL    HDL Cholesterol 35 (L) 40 - 60 mg/dL    VLDL Cholesterol Edward 23 5 - 40 mg/dL    LDL Chol Calc ([facility]) 105 (H) 0 - 100 mg/dL   CBC and Differential   Result Value Ref Range    WBC 6.71 3.40 - 10.80 10*3/mm3    RBC 4.86 3.77 - 5.28 10*6/mm3    Hemoglobin 14.4 12.0 - 15.9 g/dL    Hematocrit 43.2 34.0 - 46.6 %    MCV 88.9 79.0 - 97.0 fL    MCH 29.6 26.6 - 33.0 pg    MCHC 33.3 31.5 - 35.7 g/dL    RDW 14.3 12.3 - 15.4 %    Platelets 214 140 - 450 10*3/mm3    Neutrophil Rel % 68.1 42.7 - 76.0 %    Lymphocyte Rel % 19.2 (L) 19.6 - 45.3 %    Monocyte Rel % 6.3 5.0 - 12.0 %    Eosinophil Rel % 5.4 0.3 - 6.2 %    Basophil Rel % 0.7 0.0 - 1.5 %    Neutrophils Absolute 4.57 1.70 - 7.00 10*3/mm3    Lymphocytes Absolute 1.29 0.70 - 3.10  10*3/mm3    Monocytes Absolute 0.42 0.10 - 0.90 10*3/mm3    Eosinophils Absolute 0.36 0.00 - 0.40 10*3/mm3    Basophils Absolute 0.05 0.00 - 0.20 10*3/mm3    Immature Granulocyte Rel % 0.3 0.0 - 0.5 %    Immature Grans Absolute 0.02 0.00 - 0.05 10*3/mm3    nRBC 0.0 0.0 - 0.2 /100 WBC   TSH   Result Value Ref Range    TSH 2.000 0.270 - 4.200 uIU/mL   T4, Free   Result Value Ref Range    Free T4 1.06 0.92 - 1.68 ng/dL       your lab results are back!    Normal kidney, and liver enzymes,  Thyroid normal, not anemic  No concerns with blood sugar  Cholesterol LDL is just slightly elevated-   triglycerides improved from last year-  great job-  no need for any medication at this time, just continue to work on lower cholesterol diet and exercise.     No major concerns on labs.    If you have any questions or concerns, please don't hesitate to call.         Sincerely,        SARAHY Humphrey

## 2025-06-12 NOTE — PROGRESS NOTES
Good Morning Ghazala, your lab results are back!    Normal kidney, and liver enzymes,  Thyroid normal, not anemic  No concerns with blood sugar  Cholesterol LDL is just slightly elevated-   triglycerides improved from last year-  great job-  no need for any medication at this time, just continue to work on lower cholesterol diet and exercise.    No major concerns on labs.  Let me know if you have any questions or concerns  Lorin

## 2025-06-19 ENCOUNTER — PRIOR AUTHORIZATION (OUTPATIENT)
Dept: FAMILY MEDICINE CLINIC | Facility: CLINIC | Age: 45
End: 2025-06-19
Payer: COMMERCIAL

## 2025-06-19 NOTE — TELEPHONE ENCOUNTER
Ghazala Early (Key: [id number])  Wegovy 1MG/0.5ML auto-injectors  Form  [facility] Electronic Prior Authorization Form (2017 [place])  Created  8 minutes ago  Sent to Plan  5 minutes ago  Plan Response  4 minutes ago  Submit Clinical Questions  1 minute ago  Determination  Wait for Determination  Please wait for [facility] 2017 [place]CP[employer] to return a determination.

## 2025-06-19 NOTE — TELEPHONE ENCOUNTER
Ghazala Early (Key: [id number])  Wegovy 1MG/0.5ML auto-injectors  Form  [facility] Electronic Prior Authorization Form (2017 [place])  Created  25 minutes ago  Sent to Plan  22 minutes ago  Plan Response  22 minutes ago  Submit Clinical Questions  18 minutes ago  Determination  12 minutes ago  Message from Plan  We received your request for prior authorization for WEGOVY INJ 1MG, for the above member; however, [facility] has a denied request on file for WEGOVY INJ 1MG for this member. Please follow the appeals process outlined in the original denial or contact Prior Authorization Department at [phone] for further questions.

## (undated) DEVICE — TOWEL,OR,DSP,ST,BLUE,STD,4/PK,20PK/CS: Brand: MEDLINE

## (undated) DEVICE — PROB ABL ENDOMTRL NOVASURE/G4 W/SURESND

## (undated) DEVICE — LAPAROVUE VISIBILITY SYSTEM LAPAROSCOPIC SOLUTIONS: Brand: LAPAROVUE

## (undated) DEVICE — GLV SURG SENSICARE POLYISPRN W/ALOE PF LF 6.5 GRN STRL

## (undated) DEVICE — SKIN PREP TRAY W/CHG: Brand: MEDLINE INDUSTRIES, INC.

## (undated) DEVICE — NDL SPINE 22G 31/2IN BLK

## (undated) DEVICE — LOU LITHOTOMY ROBOTIC: Brand: MEDLINE INDUSTRIES, INC.

## (undated) DEVICE — COLUMN DRAPE

## (undated) DEVICE — CATHETER,FOLEY,100%SILICONE,16FR,10ML,LF: Brand: MEDLINE

## (undated) DEVICE — DEV TISS REMOV MYOSURE REACH

## (undated) DEVICE — SUT VIC 0/0 UR6 27IN DYED J603H

## (undated) DEVICE — TIP COVER ACCESSORY

## (undated) DEVICE — GLV SURG BIOGEL LTX PF 6 1/2

## (undated) DEVICE — SUT PDS 0 CT1 36IN Z346H

## (undated) DEVICE — ARM DRAPE

## (undated) DEVICE — SOL NACL 0.9PCT 1000ML

## (undated) DEVICE — ST TBG ENDOMAT HYSTSCPY

## (undated) DEVICE — SYR LL TP 10ML STRL

## (undated) DEVICE — GOWN,SIRUS,NON REINFRCD,LARGE,SET IN SL: Brand: MEDLINE

## (undated) DEVICE — DRSNG WND BORDR/ADHS NONADHR/GZ LF 2X2IN STRL

## (undated) DEVICE — PATIENT RETURN ELECTRODE, SINGLE-USE, CONTACT QUALITY MONITORING, ADULT, WITH 9FT CORD, FOR PATIENTS WEIGING OVER 33LBS. (15KG): Brand: MEGADYNE

## (undated) DEVICE — SEAL HYSTERSCOPE/OUTFLOW CHANNEL MYOSURE

## (undated) DEVICE — DRAPE,UNDERBUTTOCKS,PCH,STERILE: Brand: MEDLINE

## (undated) DEVICE — ANTIBACTERIAL UNDYED BRAIDED (POLYGLACTIN 910), SYNTHETIC ABSORBABLE SUTURE: Brand: COATED VICRYL

## (undated) DEVICE — SOL ANTISTICK CAUTRY ELECTROLUBE LF

## (undated) DEVICE — MANIP UTER ELEVATOR/PRO W/LNG/HNDL CERV/CUP 35MM MD

## (undated) DEVICE — GLV SURG SENSICARE PI PF LF 7 GRN STRL

## (undated) DEVICE — DRAPE,CHEST,FENES,15X10,STERIL: Brand: MEDLINE

## (undated) DEVICE — CANNULA SEAL

## (undated) DEVICE — ENDOPATH XCEL BLADELESS TROCARS WITH STABILITY SLEEVES: Brand: ENDOPATH XCEL

## (undated) DEVICE — STRIP,CLOSURE,WOUND,MEDI-STRIP,1/2X4: Brand: MEDLINE

## (undated) DEVICE — BLADELESS OBTURATOR: Brand: WECK VISTA

## (undated) DEVICE — GLV SURG BIOGEL LTX PF 5 1/2

## (undated) DEVICE — PAD SANI MAXI W/ADHS SNG WRP 11IN

## (undated) DEVICE — THE STERILE LIGHT HANDLE COVER IS USED WITH STERIS SURGICAL LIGHTING AND VISUALIZATION SYSTEMS.

## (undated) DEVICE — OSC HYSTEROSCOPY: Brand: MEDLINE INDUSTRIES, INC.